# Patient Record
Sex: FEMALE | Race: WHITE
[De-identification: names, ages, dates, MRNs, and addresses within clinical notes are randomized per-mention and may not be internally consistent; named-entity substitution may affect disease eponyms.]

---

## 2017-02-15 NOTE — REP
Clinical:  Pain.

 

Technique:  AP, lateral, bilateral oblique views of the left ankle.

 

Findings:

Soft tissue swelling is appreciated.  Age-related degenerative changes noted.  No

acute fracture or dislocation.  Ankle mortise intact.

 

Impression:

Soft tissue swelling and age-related degenerative changes.

 

 

Signed by

Tony Grigsby MD 02/15/2017 12:23 P

## 2017-05-11 NOTE — REP
REASON:  Tobacco abuse.

 

Only lung window images were sent to the PACS system for review.

 

The interstitial markings are generally increased.  There is no evidence of a

significant nodule, mass, or opacity.

 

IMPRESSION:

 

Interstitial fibrotic changes are suspected.  Pulmonary consultation is

recommended.  There is no evidence of a significant nodule.

 

 

Signed by

Maurice Amador DO 05/11/2017 03:37 P

## 2017-05-17 NOTE — REP
Digital screening mammography with CAD:

 

No comparison mammography available.  The patient relates a history of a previous

lumpectomy on the left.  No current breast symptoms.

 

Findings:  There is mild vascular calcification.  Breast parenchyma shows

scattered fibrous glandular elements and scattered non-grouped calcifications

bilaterally.  There is an asymmetric density at approximate 12 o'clock in the

right breast which is partly fat density.  This may be benign fat necrosis.  It

is asymmetric.  Further evaluation is recommended.  No other suspicious

mammographic finding.

 

Impression:

 

BIRADS category 0 incomplete breast imaging.  Asymmetric density at 12 o'clock in

the anterior third of the left breast may be postoperative change.  Diagnostic

left breast mammography and focused left breast sonography recommended.

 

This mammogram was interpreted with the aid of an FDA-approved computer-aided

detection system.   The patient states she/he had a clinical breast exam in May

2017.

 

The patient letter being requested is M0.

## 2017-05-19 NOTE — DEXA
AP SPINE   L1 - L4   1.092   -0.8       0.2

LT FEMUR   TOTAL   0.834   -1.4      -0.1

RT FEMUR   TOTAL   0.769   -1.9      -0.6

TOTAL BODY   TOTAL

OTHER



DUAL FEMUR FRAX* ASSESSMENT

Risk factors:               None.

10 year probability of fracture

Major osteoporotic fracture            15.8 %

Hip fracture                 4.8 %



COMMENTS:

Normal bone densitometry of the spine.

There is low bone density of the hips.

The density of the spine has increased 12.0% since the initial exam on 08/29/
2002.

The spine density has increased 5.6% since the most recent exam on 12/13/2007.

The density of the left hip has decreased 3.0% since the initial exam on 08/29/
2002.

The density of the left hip has decreased 6.0% since the the most recent exam 
on 12/13/2007.

The density of the right hip has decreased 7.0% since the initial exam on 08/29/
2002.

The density of the right hip has decreased 11.5% since the most recent exam on 
12/13/2007.



FOLLOW-UP:

Recommendation for the next bone density exam: 2 years.
CHRISTY

## 2017-06-16 ENCOUNTER — HOSPITAL ENCOUNTER (OUTPATIENT)
Dept: HOSPITAL 53 - M RAD | Age: 77
End: 2017-06-16
Attending: FAMILY MEDICINE
Payer: MEDICARE

## 2017-06-16 DIAGNOSIS — R92.8: Primary | ICD-10-CM

## 2017-06-16 PROCEDURE — 76642 ULTRASOUND BREAST LIMITED: CPT

## 2017-06-16 NOTE — REP
BILATERAL DIAGNOSTIC MAMMOGRAM AND BILATERAL BREAST ULTRASOUND:

 

Bilateral diagnostic mammography is performed with multiple bilateral spot

compression views obtained. No nodules are seen in the left breast. However, in

the right breast at 12-o'clock position, there does appear to be a persistent

somewhat irregular nodule approximately 1 cm in diameter. There appears to be

central fat density within the nodule. Margins are again somewhat ill defined.

 

Real-time sonographic evaluation of the 12-o'clock region of the right breast

demonstrates a shadowing hypoechoic nodule measuring 1.0 x 0.9 x 1.1 cm. Biopsy

is recommended. Real-time sonographic evaluation of left breast performed in the

retroareolar region demonstrates some dilated ducts with no discrete nodule.

 

IMPRESSION:

 

ACR 4 suspicious mammogram right breast. There does appear to be a somewhat

irregular 1 cm nodule at 12-o'clock position of the right breast, and this does

appear to contain central fat density. By ultrasound, it is shadowing and

demonstrates internal blood flow with Doppler evaluation. Recommend

ultrasound-guided biopsy with postprocedure mammogram. ACR 4 suspicious.

 

B-RADS/ACR category 4 mammogram.  Suspicious abnormality - biopsy should be

considered.  Usually requires biopsy.

 

The patient letter being requested is M4.

 

 

Signed by

Raúl Archuleta MD 06/16/2017 05:24 P

## 2017-06-19 ENCOUNTER — HOSPITAL ENCOUNTER (OUTPATIENT)
Dept: HOSPITAL 53 - M SFHCPLAZ | Age: 77
End: 2017-06-19
Attending: NURSE PRACTITIONER
Payer: MEDICARE

## 2017-06-19 DIAGNOSIS — Z53.8: ICD-10-CM

## 2017-06-19 DIAGNOSIS — R06.02: Primary | ICD-10-CM

## 2017-06-19 DIAGNOSIS — I50.40: ICD-10-CM

## 2017-06-21 ENCOUNTER — HOSPITAL ENCOUNTER (OUTPATIENT)
Dept: HOSPITAL 53 - M RAD | Age: 77
End: 2017-06-21
Attending: NURSE PRACTITIONER
Payer: MEDICARE

## 2017-06-21 ENCOUNTER — HOSPITAL ENCOUNTER (OUTPATIENT)
Dept: HOSPITAL 53 - M SFHCPLAZ | Age: 77
End: 2017-06-21
Attending: FAMILY MEDICINE
Payer: MEDICARE

## 2017-06-21 DIAGNOSIS — D50.0: ICD-10-CM

## 2017-06-21 DIAGNOSIS — D50.0: Primary | ICD-10-CM

## 2017-06-21 DIAGNOSIS — R06.02: Primary | ICD-10-CM

## 2017-06-21 LAB
ERYTHROCYTE [DISTWIDTH] IN BLOOD BY AUTOMATED COUNT: 16.7 % (ref 11.5–14.5)
MCH RBC QN AUTO: 27.5 PG (ref 27–33)
MCHC RBC AUTO-ENTMCNC: 29.6 G/DL (ref 32–36.5)
MCV RBC AUTO: 92.7 FL (ref 80–96)
PLATELET # BLD AUTO: 263 K/MM3 (ref 150–450)
WBC # BLD AUTO: 6.1 K/MM3 (ref 4–10)

## 2017-06-26 ENCOUNTER — HOSPITAL ENCOUNTER (OUTPATIENT)
Dept: HOSPITAL 53 - M SFHCPLAZ | Age: 77
End: 2017-06-26
Attending: NURSE PRACTITIONER
Payer: MEDICARE

## 2017-06-26 DIAGNOSIS — N30.00: Primary | ICD-10-CM

## 2017-06-26 PROCEDURE — 87088 URINE BACTERIA CULTURE: CPT

## 2017-06-26 PROCEDURE — 87186 SC STD MICRODIL/AGAR DIL: CPT

## 2017-06-26 PROCEDURE — 81002 URINALYSIS NONAUTO W/O SCOPE: CPT

## 2017-07-25 ENCOUNTER — HOSPITAL ENCOUNTER (OUTPATIENT)
Dept: HOSPITAL 53 - M RADPRO | Age: 77
Discharge: HOME | End: 2017-07-25
Attending: SURGERY
Payer: MEDICARE

## 2017-07-25 DIAGNOSIS — M10.9: ICD-10-CM

## 2017-07-25 DIAGNOSIS — Z87.891: ICD-10-CM

## 2017-07-25 DIAGNOSIS — Z95.5: ICD-10-CM

## 2017-07-25 DIAGNOSIS — J98.4: ICD-10-CM

## 2017-07-25 DIAGNOSIS — N18.3: ICD-10-CM

## 2017-07-25 DIAGNOSIS — E78.00: ICD-10-CM

## 2017-07-25 DIAGNOSIS — N63: Primary | ICD-10-CM

## 2017-07-25 DIAGNOSIS — K21.9: ICD-10-CM

## 2017-07-25 DIAGNOSIS — I12.9: ICD-10-CM

## 2017-07-25 DIAGNOSIS — L76.32: ICD-10-CM

## 2017-07-25 DIAGNOSIS — Z79.899: ICD-10-CM

## 2017-07-25 PROCEDURE — 19083 BX BREAST 1ST LESION US IMAG: CPT

## 2017-07-25 PROCEDURE — 88305 TISSUE EXAM BY PATHOLOGIST: CPT

## 2017-07-25 NOTE — REP
POST BIOPSY MAMMOGRAM RIGHT BREAST:

 

Post biopsy mammogram right breast performed.  Patient had ultrasound guided

biopsy of a suspicious nodule at 12 o'clock in the right breast.  There is a

metallic clip now seen on today's mammogram at the site of the nodule comparing

to the prior mammogram of 06/16/2017.

 

 

Signed by

Raúl Archuleta MD 07/25/2017 03:30 P

## 2017-07-25 NOTE — REP
ULTRASOUND GUIDED RIGHT BREAST BIOPSY:

 

The procedure was performed under the direct supervision of Dr. Archuleta.

 

The patient has a history of an irregular 1 cm nodule at the 12 o'clock position

of the right breast seen on a previous ultrasound dated 06/16/2017.

 

The risks and benefits of the procedure were explained to the patient and

informed consent was obtained.

 

The right breast nodule was localized using ultrasound guidance. The skin was

prepped and draped in a sterile fashion 1% Xylocaine was used as a local

anesthetic. Using ultrasound guidance a 13-gauge suction assisted mammotome

needle was inserted and 5 core biopsy samples were obtained. A marker clip was

placed at the biopsy site.

 

Post-biopsy ultrasound demonstrates a small hematoma. Manual pressure was placed

over the site for approximately 5-10 minutes. Ultrasound images obtained

approximately half an hour later show enlargement of the hematoma. Again manual

pressure was held at the site. Images obtained approximately 1 hour later show

the hematoma to have gotten slightly smaller.

 

After the appropriate amount of monitored convalescence the patient was

discharged from the department.

 

The patient tolerated the procedure well and there were no immediate

complications.

 

 

Reviewed by

BERNARDA Cade 07/26/2017 01:35 PEdited and Signed by

Raúl Archuleta MD 07/27/2017 05:38 P

## 2017-07-31 ENCOUNTER — HOSPITAL ENCOUNTER (OUTPATIENT)
Dept: HOSPITAL 53 - M LAB | Age: 77
End: 2017-07-31
Attending: OPHTHALMOLOGY
Payer: MEDICARE

## 2017-07-31 ENCOUNTER — HOSPITAL ENCOUNTER (OUTPATIENT)
Dept: HOSPITAL 53 - M LAB | Age: 77
End: 2017-07-31
Attending: FAMILY MEDICINE
Payer: MEDICARE

## 2017-07-31 DIAGNOSIS — D50.0: ICD-10-CM

## 2017-07-31 DIAGNOSIS — I10: Primary | ICD-10-CM

## 2017-07-31 DIAGNOSIS — D50.0: Primary | ICD-10-CM

## 2017-07-31 LAB
ERYTHROCYTE [DISTWIDTH] IN BLOOD BY AUTOMATED COUNT: 18 % (ref 11.5–14.5)
MCH RBC QN AUTO: 27.7 PG (ref 27–33)
MCHC RBC AUTO-ENTMCNC: 30.4 G/DL (ref 32–36.5)
MCV RBC AUTO: 91.2 FL (ref 80–96)
PLATELET # BLD AUTO: 303 K/MM3 (ref 150–450)
WBC # BLD AUTO: 7.9 K/MM3 (ref 4–10)

## 2017-07-31 NOTE — ECGEPIP
Stationary ECG Study

                              [facility]

                                       

                                       Test Date:    2017

Pat Name:     LINETTE MONIQUE           Department:   

Patient ID:   [id number]                 Room:         -

Gender:       F                        Technician:   

:          [id number]               Requested By: SHELLEY PURCELL

Order Number: [id number]     Reading MD:   Joesph Amaya

                                 Measurements

Intervals                              Axis          

Rate:         65                       P:            77

VA:           261                      QRS:          99

QRSD:         130                      T:            270

QT:           474                                    

QTc:          493                                    

                           Interpretive Statements

Incorrect limb lead placement.

Underlying sinus rhythm?

Marked first-degree AV block.

Prominent precordial voltage with incomplete LBBB and strain in keeping with

LVH.

Other than incorrect lead placement, no apparent change from 16.

 

Electronically Signed On 2017 19:24:58 EDT by Joesph Amaya

## 2017-08-14 ENCOUNTER — HOSPITAL ENCOUNTER (EMERGENCY)
Dept: HOSPITAL 53 - M ED | Age: 77
Discharge: HOME | End: 2017-08-14
Payer: MEDICARE

## 2017-08-14 VITALS — HEIGHT: 65 IN | WEIGHT: 192.46 LBS | BODY MASS INDEX: 32.07 KG/M2

## 2017-08-14 VITALS — SYSTOLIC BLOOD PRESSURE: 145 MMHG | DIASTOLIC BLOOD PRESSURE: 63 MMHG

## 2017-08-14 DIAGNOSIS — Z87.891: ICD-10-CM

## 2017-08-14 DIAGNOSIS — Z79.01: ICD-10-CM

## 2017-08-14 DIAGNOSIS — Z79.899: ICD-10-CM

## 2017-08-14 DIAGNOSIS — Z88.1: ICD-10-CM

## 2017-08-14 DIAGNOSIS — Z88.5: ICD-10-CM

## 2017-08-14 DIAGNOSIS — I10: ICD-10-CM

## 2017-08-14 DIAGNOSIS — Z88.2: ICD-10-CM

## 2017-08-14 DIAGNOSIS — R04.0: Primary | ICD-10-CM

## 2017-08-14 LAB
BASOPHILS # BLD AUTO: 0 K/MM3 (ref 0–0.2)
BASOPHILS NFR BLD AUTO: 0.6 % (ref 0–1)
EOSINOPHIL # BLD AUTO: 0.2 K/MM3 (ref 0–0.5)
EOSINOPHIL NFR BLD AUTO: 1.7 % (ref 0–3)
ERYTHROCYTE [DISTWIDTH] IN BLOOD BY AUTOMATED COUNT: 18.5 % (ref 11.5–14.5)
INR PPP: 3.12
LARGE UNSTAINED CELL #: 0.1 K/MM3 (ref 0–0.4)
LARGE UNSTAINED CELL %: 1.5 % (ref 0–4)
LYMPHOCYTES # BLD AUTO: 1.4 K/MM3 (ref 1.5–4.5)
LYMPHOCYTES NFR BLD AUTO: 13.9 % (ref 24–44)
MCH RBC QN AUTO: 28.2 PG (ref 27–33)
MCHC RBC AUTO-ENTMCNC: 30.5 G/DL (ref 32–36.5)
MCV RBC AUTO: 92.2 FL (ref 80–96)
MONOCYTES # BLD AUTO: 0.5 K/MM3 (ref 0–0.8)
MONOCYTES NFR BLD AUTO: 5.2 % (ref 0–5)
NEUTROPHILS # BLD AUTO: 7 K/MM3 (ref 1.8–7.7)
NEUTROPHILS NFR BLD AUTO: 77.1 % (ref 36–66)
PLATELET # BLD AUTO: 306 K/MM3 (ref 150–450)
WBC # BLD AUTO: 9.1 K/MM3 (ref 4–10)

## 2017-08-17 ENCOUNTER — HOSPITAL ENCOUNTER (OUTPATIENT)
Dept: HOSPITAL 53 - M SDC | Age: 77
Discharge: HOME | End: 2017-08-17
Attending: OPHTHALMOLOGY
Payer: MEDICARE

## 2017-08-17 VITALS — HEIGHT: 65 IN | BODY MASS INDEX: 31.65 KG/M2 | WEIGHT: 190 LBS

## 2017-08-17 VITALS — SYSTOLIC BLOOD PRESSURE: 137 MMHG | DIASTOLIC BLOOD PRESSURE: 63 MMHG

## 2017-08-17 DIAGNOSIS — D64.9: ICD-10-CM

## 2017-08-17 DIAGNOSIS — I50.9: ICD-10-CM

## 2017-08-17 DIAGNOSIS — I73.9: ICD-10-CM

## 2017-08-17 DIAGNOSIS — H25.12: Primary | ICD-10-CM

## 2017-08-17 DIAGNOSIS — Z79.899: ICD-10-CM

## 2017-08-17 DIAGNOSIS — Z86.73: ICD-10-CM

## 2017-08-17 DIAGNOSIS — N18.4: ICD-10-CM

## 2017-08-17 DIAGNOSIS — Z95.1: ICD-10-CM

## 2017-08-17 DIAGNOSIS — Z88.5: ICD-10-CM

## 2017-08-17 DIAGNOSIS — I34.8: ICD-10-CM

## 2017-08-17 DIAGNOSIS — I10: ICD-10-CM

## 2017-08-17 DIAGNOSIS — I25.10: ICD-10-CM

## 2017-08-17 DIAGNOSIS — E78.5: ICD-10-CM

## 2017-08-17 DIAGNOSIS — Z88.2: ICD-10-CM

## 2017-08-17 LAB — INR PPP: 2.8

## 2017-08-17 PROCEDURE — 36415 COLL VENOUS BLD VENIPUNCTURE: CPT

## 2017-08-17 PROCEDURE — 66984 XCAPSL CTRC RMVL W/O ECP: CPT

## 2017-08-17 PROCEDURE — 85610 PROTHROMBIN TIME: CPT

## 2017-08-20 NOTE — RO
DATE OF PROCEDURE:  08/17/2017



PREOPERATIVE DIAGNOSIS:

Visually significant nuclear sclerotic cataract left eye.

 

POSTOPERATIVE DIAGNOSIS:

Visually significant nuclear sclerotic cataract left eye.

 

PROCEDURE:

Cataract extraction with use of phacoemulsification, and placement of 
intraocular

lens, AU00T0 20.5D , left eye.

 

SURGEON:  Jarad Ballesteros DO

 

ASSISTANT:

 

ANESTHESIA:  Local with monitored anesthesia care (MAC).

 

COMPLICATIONS:  None.

 

POSTOPERATIVE CONDITION:  Stable.

 

INDICATION FOR SURGERY:

Blurred vision left eye affecting patient's activities of daily living.

 

DESCRIPTION OF PROCEDURE:  The patient was seen in the preoperative area and

properly identified.  The correct operative eye was identified and marked.

Attention was turned to that eye.  The patient received topical antibiotics in

the preoperative area.  The patient then received topical dilating drops

consisting of Tropicamide and Phenylephrine.

 

The patient was then transferred to the operating room.  The correct side was

re-identified.  The patient received topical anesthetics and antibiotics on the

surface of the eye.  The eye was prepped and draped in a sterile fashion.  The

upper and lower eyelids were isolated with Tegaderm tape, and the lids were held

open with an adjustable speculum.

 

Using a sideport blade, a paracentesis incision was made.  Intraocular

preservative-free lidocaine was then injected into the anterior chamber.

Viscoelastic was then injected into the anterior chamber through the

paracentesis.  Using a 2.6 mm sharp-tipped keratome, the anterior chamber was

entered via a temporal clear corneal incision.

 

A continuous curvilinear capsulorrhexis was created with the aid of a 26g

cystotome and utrata forceps.  Hydrodissection was performed with balanced salt

solution (BSS) on a blunt cannula until the nucleus was freely mobile.  The

crystalline lens was phacoemulsified and aspirated.  Additional cohesive

viscoelastic was placed into the capsular bag to deepen it.  A 20.5 lens D was

placed into the capsular bag and confirmed by visualizing the continuous

curvilinear capsulorrhexis.  Additional irrigation and aspiration was used to

remove cortical material and remaining viscoelastic.

 

The clear corneal incision was hydrated with BSS on a blunt cannula.  The lens

was well positioned.  The incisions were then tested for leaks and found to be

negative.  The eye was then palpated for appropriate pressure and adjusted

accordingly with BSS.

 

The eyelid speculum was carefully removed.  A shield was placed over the eye.

 

The patient tolerated the procedure well and was discharged to the recovery unit

in a stable condition.



edited: 08/21/2017 1437 tkf

MTDALICIA

## 2017-08-28 ENCOUNTER — HOSPITAL ENCOUNTER (OUTPATIENT)
Dept: HOSPITAL 53 - M LAB | Age: 77
End: 2017-08-28
Attending: FAMILY MEDICINE
Payer: MEDICARE

## 2017-08-28 DIAGNOSIS — D50.0: Primary | ICD-10-CM

## 2017-08-28 LAB
ADD MORPHOLOGY?: YES
ANISOCYTOSIS BLD QL SMEAR: (no result)
BASOPHILS # BLD AUTO: 0.1 K/MM3 (ref 0–0.2)
BASOPHILS NFR BLD AUTO: 1 % (ref 0–1)
EOSINOPHIL # BLD AUTO: 0.2 K/MM3 (ref 0–0.5)
EOSINOPHIL NFR BLD AUTO: 2.3 % (ref 0–3)
ERYTHROCYTE [DISTWIDTH] IN BLOOD BY AUTOMATED COUNT: 17.3 % (ref 11.5–14.5)
HYPOCHROMIA BLD QL SMEAR: (no result)
IRON SATN MFR SERPL: 13.2 % (ref 13.2–45)
LARGE UNSTAINED CELL #: 0.2 K/MM3 (ref 0–0.4)
LARGE UNSTAINED CELL %: 2.3 % (ref 0–4)
LYMPHOCYTES # BLD AUTO: 1 K/MM3 (ref 1.5–4.5)
LYMPHOCYTES NFR BLD AUTO: 14.7 % (ref 24–44)
MCH RBC QN AUTO: 27.3 PG (ref 27–33)
MCHC RBC AUTO-ENTMCNC: 29.9 G/DL (ref 32–36.5)
MCV RBC AUTO: 91.1 FL (ref 80–96)
MONOCYTES # BLD AUTO: 0.4 K/MM3 (ref 0–0.8)
MONOCYTES NFR BLD AUTO: 6.2 % (ref 0–5)
NEUTROPHILS # BLD AUTO: 5.2 K/MM3 (ref 1.8–7.7)
NEUTROPHILS NFR BLD AUTO: 73.5 % (ref 36–66)
OVALOCYTES BLD QL SMEAR: (no result)
PLATELET # BLD AUTO: 332 K/MM3 (ref 150–450)
POLYCHROMASIA BLD QL SMEAR: (no result)
TIBC SERPL-MCNC: 408 UG/DL (ref 250–450)
WBC # BLD AUTO: 7 K/MM3 (ref 4–10)

## 2017-08-30 ENCOUNTER — HOSPITAL ENCOUNTER (OUTPATIENT)
Dept: HOSPITAL 53 - M OPCLI4PV | Age: 77
Discharge: HOME | End: 2017-08-30
Attending: NURSE PRACTITIONER
Payer: MEDICARE

## 2017-08-30 DIAGNOSIS — Z88.2: ICD-10-CM

## 2017-08-30 DIAGNOSIS — Z88.8: ICD-10-CM

## 2017-08-30 DIAGNOSIS — D50.0: Primary | ICD-10-CM

## 2017-08-30 DIAGNOSIS — Z88.5: ICD-10-CM

## 2017-08-30 PROCEDURE — 86920 COMPATIBILITY TEST SPIN: CPT

## 2017-08-30 PROCEDURE — 36415 COLL VENOUS BLD VENIPUNCTURE: CPT

## 2017-08-30 PROCEDURE — 86901 BLOOD TYPING SEROLOGIC RH(D): CPT

## 2017-08-30 PROCEDURE — 36430 TRANSFUSION BLD/BLD COMPNT: CPT

## 2017-08-30 PROCEDURE — 86900 BLOOD TYPING SEROLOGIC ABO: CPT

## 2017-08-30 PROCEDURE — 86850 RBC ANTIBODY SCREEN: CPT

## 2017-08-31 ENCOUNTER — HOSPITAL ENCOUNTER (OUTPATIENT)
Dept: HOSPITAL 53 - M SDC | Age: 77
Discharge: HOME | End: 2017-08-31
Attending: OPHTHALMOLOGY
Payer: MEDICARE

## 2017-08-31 VITALS — HEIGHT: 65 IN | WEIGHT: 190 LBS | BODY MASS INDEX: 31.65 KG/M2

## 2017-08-31 VITALS — SYSTOLIC BLOOD PRESSURE: 133 MMHG | DIASTOLIC BLOOD PRESSURE: 65 MMHG

## 2017-08-31 DIAGNOSIS — N18.3: ICD-10-CM

## 2017-08-31 DIAGNOSIS — G47.33: ICD-10-CM

## 2017-08-31 DIAGNOSIS — Z79.899: ICD-10-CM

## 2017-08-31 DIAGNOSIS — I65.23: ICD-10-CM

## 2017-08-31 DIAGNOSIS — Z86.73: ICD-10-CM

## 2017-08-31 DIAGNOSIS — I50.32: ICD-10-CM

## 2017-08-31 DIAGNOSIS — I25.10: ICD-10-CM

## 2017-08-31 DIAGNOSIS — D50.9: ICD-10-CM

## 2017-08-31 DIAGNOSIS — E78.5: ICD-10-CM

## 2017-08-31 DIAGNOSIS — Z95.5: ICD-10-CM

## 2017-08-31 DIAGNOSIS — I34.8: ICD-10-CM

## 2017-08-31 DIAGNOSIS — I48.91: ICD-10-CM

## 2017-08-31 DIAGNOSIS — I73.9: ICD-10-CM

## 2017-08-31 DIAGNOSIS — J44.9: ICD-10-CM

## 2017-08-31 DIAGNOSIS — Z95.1: ICD-10-CM

## 2017-08-31 DIAGNOSIS — H25.11: Primary | ICD-10-CM

## 2017-08-31 DIAGNOSIS — Z88.2: ICD-10-CM

## 2017-08-31 DIAGNOSIS — I25.2: ICD-10-CM

## 2017-08-31 DIAGNOSIS — Z99.81: ICD-10-CM

## 2017-08-31 DIAGNOSIS — Z88.5: ICD-10-CM

## 2017-08-31 DIAGNOSIS — Z88.1: ICD-10-CM

## 2017-08-31 DIAGNOSIS — Z79.01: ICD-10-CM

## 2017-08-31 DIAGNOSIS — I13.0: ICD-10-CM

## 2017-08-31 PROCEDURE — 66984 XCAPSL CTRC RMVL W/O ECP: CPT

## 2017-09-01 NOTE — RO
DATE OF PROCEDURE:  08/31/2017

 

PREOPERATIVE DIAGNOSIS:

Visually significant nuclear sclerotic cataract right eye.

 

POSTOPERATIVE DIAGNOSIS:

Visually significant nuclear sclerotic cataract right eye.

 

PROCEDURE:

Cataract extraction with use of phacoemulsification, and placement of 
intraocular

lens, AU00T0, 23.5 D, right eye.

 

SURGEON:  Jarad Ballesteros DO

 

ASSISTANT:

 

ANESTHESIA:  Local with monitored anesthesia care (MAC).

 

COMPLICATIONS:  None.

 

POSTOPERATIVE CONDITION:  Stable.

 

INDICATION FOR SURGERY:

Blurred vision right eye affecting patient's activities of daily living.

 

DESCRIPTION OF PROCEDURE:  The patient was seen in the preoperative area and

properly identified.  The correct operative eye was identified and marked.

Attention was turned to that eye.  The patient received topical antibiotics in

the preoperative area.  The patient then received topical dilating drops

consisting of Tropicamide and Phenylephrine.

 

The patient was then transferred to the operating room.  The correct side was

re-identified.  The patient received topical anesthetics and antibiotics on the

surface of the eye.  The eye was prepped and draped in a sterile fashion.  The

upper and lower eyelids were isolated with Tegaderm tape, and the lids were held

open with an adjustable speculum.

 

Using a sideport blade, a paracentesis incision was made.  Intraocular

preservative-free lidocaine was then injected into the anterior chamber.

Viscoelastic was then injected into the anterior chamber through the

paracentesis.  Using a 2.6 mm sharp-tipped keratome, the anterior chamber was

entered via a temporal clear corneal incision.

 

A continuous curvilinear capsulorrhexis was created with the aid of a 26g

cystotome and utrata forceps.  Hydrodissection was performed with balanced salt

solution (BSS) on a blunt cannula until the nucleus was freely mobile.  The

crystalline lens was phacoemulsified and aspirated.  Additional cohesive

viscoelastic was placed into the capsular bag to deepen it.  An AU00T0, 23.5 D

was placed into the capsular bag and confirmed by visualizing the continuous

curvilinear capsulorrhexis.  Additional irrigation and aspiration was used to

remove cortical material and remaining viscoelastic.

 

The clear corneal incision was hydrated with BSS on a blunt cannula.  The lens

was well positioned.  The incisions were then tested for leaks and found to be

negative.  The eye was then palpated for appropriate pressure and adjusted

accordingly with BSS.

 

The eyelid speculum was carefully removed.  A shield was placed over the eye.

 

The patient tolerated the procedure well and was discharged to the recovery unit

in a stable condition.

CHRISTY

## 2017-10-17 ENCOUNTER — HOSPITAL ENCOUNTER (OUTPATIENT)
Dept: HOSPITAL 53 - M WUC | Age: 77
End: 2017-10-17
Attending: FAMILY MEDICINE
Payer: MEDICARE

## 2017-10-17 DIAGNOSIS — D50.0: Primary | ICD-10-CM

## 2017-10-17 LAB
BASOPHILS # BLD AUTO: 0.1 10^3/UL (ref 0–0.2)
BASOPHILS NFR BLD AUTO: 1 % (ref 0–1)
EOSINOPHIL # BLD AUTO: 0.3 10^3/UL (ref 0–0.5)
EOSINOPHIL NFR BLD AUTO: 3.5 % (ref 0–3)
ERYTHROCYTE [DISTWIDTH] IN BLOOD BY AUTOMATED COUNT: 17.3 % (ref 11.5–14.5)
IMM GRANULOCYTES NFR BLD: 0.5 % (ref 0–0)
INR PPP: 2.05
LYMPHOCYTES # BLD AUTO: 1.4 10^3/UL (ref 1.5–4.5)
LYMPHOCYTES NFR BLD AUTO: 15.1 % (ref 24–44)
MCH RBC QN AUTO: 28.2 PG (ref 27–33)
MCHC RBC AUTO-ENTMCNC: 29.7 G/DL (ref 32–36.5)
MCV RBC AUTO: 95.1 FL (ref 80–96)
MONOCYTES # BLD AUTO: 1 10^3/UL (ref 0–0.8)
MONOCYTES NFR BLD AUTO: 10.7 % (ref 0–5)
NEUTROPHILS # BLD AUTO: 6.4 10^3/UL (ref 1.8–7.7)
NEUTROPHILS NFR BLD AUTO: 69.2 % (ref 36–66)
NRBC BLD AUTO-RTO: 0 % (ref 0–0)
PLATELET # BLD AUTO: 351 10^3/UL (ref 150–450)
WBC # BLD AUTO: 9.2 10^3/UL (ref 4–10)

## 2017-11-16 ENCOUNTER — HOSPITAL ENCOUNTER (OUTPATIENT)
Dept: HOSPITAL 53 - M SFHCPLAZ | Age: 77
End: 2017-11-16
Attending: NURSE PRACTITIONER
Payer: MEDICARE

## 2017-11-16 DIAGNOSIS — Z51.81: Primary | ICD-10-CM

## 2017-11-16 DIAGNOSIS — N18.3: ICD-10-CM

## 2017-11-16 DIAGNOSIS — D50.0: ICD-10-CM

## 2017-11-16 DIAGNOSIS — Z79.899: ICD-10-CM

## 2017-11-16 LAB
ALBUMIN SERPL BCG-MCNC: 3.5 GM/DL (ref 3.2–5.2)
ALBUMIN/GLOB SERPL: 0.74 {RATIO} (ref 1–1.93)
ALP SERPL-CCNC: 101 U/L (ref 45–117)
ALT SERPL W P-5'-P-CCNC: 22 U/L (ref 12–78)
ANION GAP SERPL CALC-SCNC: 5 MEQ/L (ref 8–16)
AST SERPL-CCNC: 33 U/L (ref 7–37)
BASOPHILS # BLD AUTO: 0.1 10^3/UL (ref 0–0.2)
BASOPHILS NFR BLD AUTO: 0.6 % (ref 0–1)
BILIRUB SERPL-MCNC: 0.5 MG/DL (ref 0.2–1)
BUN SERPL-MCNC: 79 MG/DL (ref 7–18)
CALCIUM SERPL-MCNC: 9.8 MG/DL (ref 8.8–10.2)
CHLORIDE SERPL-SCNC: 96 MEQ/L (ref 98–107)
CO2 SERPL-SCNC: 35 MEQ/L (ref 21–32)
CREAT SERPL-MCNC: 2.22 MG/DL (ref 0.55–1.02)
EOSINOPHIL # BLD AUTO: 0.3 10^3/UL (ref 0–0.5)
EOSINOPHIL NFR BLD AUTO: 3.7 % (ref 0–3)
ERYTHROCYTE [DISTWIDTH] IN BLOOD BY AUTOMATED COUNT: 17.6 % (ref 11.5–14.5)
GFR SERPL CREATININE-BSD FRML MDRD: 22.8 ML/MIN/{1.73_M2} (ref 39–?)
GLUCOSE SERPL-MCNC: 92 MG/DL (ref 83–110)
IMM GRANULOCYTES NFR BLD: 0.5 % (ref 0–0)
IRON SATN MFR SERPL: 13.6 % (ref 13.2–45)
LYMPHOCYTES # BLD AUTO: 1.3 10^3/UL (ref 1.5–4.5)
LYMPHOCYTES NFR BLD AUTO: 14.1 % (ref 24–44)
MCH RBC QN AUTO: 27.9 PG (ref 27–33)
MCHC RBC AUTO-ENTMCNC: 29 G/DL (ref 32–36.5)
MCV RBC AUTO: 96.3 FL (ref 80–96)
MONOCYTES # BLD AUTO: 1 10^3/UL (ref 0–0.8)
MONOCYTES NFR BLD AUTO: 10.5 % (ref 0–5)
NEUTROPHILS # BLD AUTO: 6.6 10^3/UL (ref 1.8–7.7)
NEUTROPHILS NFR BLD AUTO: 70.6 % (ref 36–66)
NRBC BLD AUTO-RTO: 0 % (ref 0–0)
PLATELET # BLD AUTO: 361 10^3/UL (ref 150–450)
POTASSIUM SERPL-SCNC: 4.6 MEQ/L (ref 3.5–5.1)
PROT SERPL-MCNC: 8.2 GM/DL (ref 6.4–8.2)
SODIUM SERPL-SCNC: 136 MEQ/L (ref 136–145)
TIBC SERPL-MCNC: 376 UG/DL (ref 250–450)
WBC # BLD AUTO: 9.3 10^3/UL (ref 4–10)

## 2017-11-16 PROCEDURE — 85610 PROTHROMBIN TIME: CPT

## 2017-11-16 PROCEDURE — 36415 COLL VENOUS BLD VENIPUNCTURE: CPT

## 2017-11-16 PROCEDURE — 85025 COMPLETE CBC W/AUTO DIFF WBC: CPT

## 2017-11-16 PROCEDURE — 83550 IRON BINDING TEST: CPT

## 2017-11-16 PROCEDURE — 80053 COMPREHEN METABOLIC PANEL: CPT

## 2017-12-04 ENCOUNTER — HOSPITAL ENCOUNTER (OUTPATIENT)
Dept: HOSPITAL 53 - M SFHCPLAZ | Age: 77
End: 2017-12-04
Attending: NURSE PRACTITIONER
Payer: MEDICARE

## 2017-12-04 DIAGNOSIS — D50.9: Primary | ICD-10-CM

## 2017-12-04 LAB
ERYTHROCYTE [DISTWIDTH] IN BLOOD BY AUTOMATED COUNT: 17.3 % (ref 11.5–14.5)
MCH RBC QN AUTO: 28.4 PG (ref 27–33)
MCHC RBC AUTO-ENTMCNC: 30.3 G/DL (ref 32–36.5)
MCV RBC AUTO: 93.5 FL (ref 80–96)
NRBC BLD AUTO-RTO: 0 % (ref 0–0)
PLATELET # BLD AUTO: 350 10^3/UL (ref 150–450)
WBC # BLD AUTO: 9.4 10^3/UL (ref 4–10)

## 2017-12-04 PROCEDURE — 85610 PROTHROMBIN TIME: CPT

## 2017-12-04 PROCEDURE — 85027 COMPLETE CBC AUTOMATED: CPT

## 2017-12-04 PROCEDURE — 36415 COLL VENOUS BLD VENIPUNCTURE: CPT

## 2017-12-15 ENCOUNTER — HOSPITAL ENCOUNTER (OUTPATIENT)
Dept: HOSPITAL 53 - M ED | Age: 77
Setting detail: OBSERVATION
Discharge: HOME | End: 2017-12-15
Attending: FAMILY MEDICINE | Admitting: HOSPITALIST
Payer: MEDICARE

## 2017-12-15 VITALS — HEIGHT: 66 IN | WEIGHT: 175.27 LBS | BODY MASS INDEX: 28.17 KG/M2

## 2017-12-15 VITALS — SYSTOLIC BLOOD PRESSURE: 122 MMHG | DIASTOLIC BLOOD PRESSURE: 74 MMHG

## 2017-12-15 DIAGNOSIS — Z79.01: ICD-10-CM

## 2017-12-15 DIAGNOSIS — Z95.2: ICD-10-CM

## 2017-12-15 DIAGNOSIS — Z88.1: ICD-10-CM

## 2017-12-15 DIAGNOSIS — Z87.891: ICD-10-CM

## 2017-12-15 DIAGNOSIS — Z88.2: ICD-10-CM

## 2017-12-15 DIAGNOSIS — I65.29: ICD-10-CM

## 2017-12-15 DIAGNOSIS — I13.0: ICD-10-CM

## 2017-12-15 DIAGNOSIS — N18.4: ICD-10-CM

## 2017-12-15 DIAGNOSIS — E78.5: ICD-10-CM

## 2017-12-15 DIAGNOSIS — I48.2: ICD-10-CM

## 2017-12-15 DIAGNOSIS — R04.0: Primary | ICD-10-CM

## 2017-12-15 DIAGNOSIS — I25.10: ICD-10-CM

## 2017-12-15 DIAGNOSIS — Z88.5: ICD-10-CM

## 2017-12-15 DIAGNOSIS — D64.9: ICD-10-CM

## 2017-12-15 DIAGNOSIS — J44.9: ICD-10-CM

## 2017-12-15 DIAGNOSIS — I73.9: ICD-10-CM

## 2017-12-15 DIAGNOSIS — Z79.899: ICD-10-CM

## 2017-12-15 DIAGNOSIS — G47.33: ICD-10-CM

## 2017-12-15 DIAGNOSIS — Z95.1: ICD-10-CM

## 2017-12-15 LAB
BASOPHILS # BLD AUTO: 0.1 10^3/UL (ref 0–0.2)
BASOPHILS # BLD AUTO: 0.1 10^3/UL (ref 0–0.2)
BASOPHILS NFR BLD AUTO: 0.5 % (ref 0–1)
BASOPHILS NFR BLD AUTO: 0.6 % (ref 0–1)
EOSINOPHIL # BLD AUTO: 0.2 10^3/UL (ref 0–0.5)
EOSINOPHIL # BLD AUTO: 0.2 10^3/UL (ref 0–0.5)
EOSINOPHIL NFR BLD AUTO: 1.9 % (ref 0–3)
EOSINOPHIL NFR BLD AUTO: 2 % (ref 0–3)
ERYTHROCYTE [DISTWIDTH] IN BLOOD BY AUTOMATED COUNT: 17.5 % (ref 11.5–14.5)
ERYTHROCYTE [DISTWIDTH] IN BLOOD BY AUTOMATED COUNT: 18.4 % (ref 11.5–14.5)
IMM GRANULOCYTES NFR BLD: 0.7 % (ref 0–0)
IMM GRANULOCYTES NFR BLD: 0.7 % (ref 0–0)
INR PPP: 2.39
LYMPHOCYTES # BLD AUTO: 1.4 10^3/UL (ref 1.5–4.5)
LYMPHOCYTES # BLD AUTO: 1.9 10^3/UL (ref 1.5–4.5)
LYMPHOCYTES NFR BLD AUTO: 12.4 % (ref 24–44)
LYMPHOCYTES NFR BLD AUTO: 21.1 % (ref 24–44)
MCH RBC QN AUTO: 29.4 PG (ref 27–33)
MCH RBC QN AUTO: 29.4 PG (ref 27–33)
MCHC RBC AUTO-ENTMCNC: 31 G/DL (ref 32–36.5)
MCHC RBC AUTO-ENTMCNC: 31.4 G/DL (ref 32–36.5)
MCV RBC AUTO: 93.8 FL (ref 80–96)
MCV RBC AUTO: 94.5 FL (ref 80–96)
MONOCYTES # BLD AUTO: 0.8 10^3/UL (ref 0–0.8)
MONOCYTES # BLD AUTO: 1 10^3/UL (ref 0–0.8)
MONOCYTES NFR BLD AUTO: 8.4 % (ref 0–5)
MONOCYTES NFR BLD AUTO: 9 % (ref 0–5)
NEUTROPHILS # BLD AUTO: 5.9 10^3/UL (ref 1.8–7.7)
NEUTROPHILS # BLD AUTO: 8.7 10^3/UL (ref 1.8–7.7)
NEUTROPHILS NFR BLD AUTO: 66.7 % (ref 36–66)
NEUTROPHILS NFR BLD AUTO: 76 % (ref 36–66)
NRBC BLD AUTO-RTO: 0 % (ref 0–0)
NRBC BLD AUTO-RTO: 0.5 % (ref 0–0)
PLATELET # BLD AUTO: 240 10^3/UL (ref 150–450)
PLATELET # BLD AUTO: 336 10^3/UL (ref 150–450)
WBC # BLD AUTO: 11.5 10^3/UL (ref 4–10)
WBC # BLD AUTO: 8.8 10^3/UL (ref 4–10)

## 2017-12-15 PROCEDURE — 86901 BLOOD TYPING SEROLOGIC RH(D): CPT

## 2017-12-15 PROCEDURE — 85025 COMPLETE CBC W/AUTO DIFF WBC: CPT

## 2017-12-15 PROCEDURE — 36430 TRANSFUSION BLD/BLD COMPNT: CPT

## 2017-12-15 PROCEDURE — 85610 PROTHROMBIN TIME: CPT

## 2017-12-15 PROCEDURE — 36415 COLL VENOUS BLD VENIPUNCTURE: CPT

## 2017-12-15 PROCEDURE — 99284 EMERGENCY DEPT VISIT MOD MDM: CPT

## 2017-12-15 PROCEDURE — 86900 BLOOD TYPING SEROLOGIC ABO: CPT

## 2017-12-15 PROCEDURE — 86850 RBC ANTIBODY SCREEN: CPT

## 2017-12-15 PROCEDURE — 86920 COMPATIBILITY TEST SPIN: CPT

## 2017-12-15 PROCEDURE — 85730 THROMBOPLASTIN TIME PARTIAL: CPT

## 2017-12-15 NOTE — HPE
DATE OF ADMISSION:  12/15/2017

 

PRIMARY CARE PROVIDER:  Dr. Zackary Hernandez

 

CHIEF COMPLAINT:  Nose bleeding for two days.

 

HISTORY OF PRESENT ILLNESS:  The patient is a 77-year-old white female with

multiple chronic medical conditions including mechanical valve on Coumadin who

presented to the emergency room (ER) for evaluation of nose bleeding in the last

two days.  History is provided by herself as well as by review of chart.  Per the

medical record, she had been hospitalized here in the past due to nose bleeding.

She was seen with the ENT specialist who recommended to use a humidifier since

the patient is on oxygen supplement which can cause irritation of the nose.

Anyway, she stated in the last two days she has intermittent a lot of nose

bleeding which brought her to the ER tonight.  After she came to the ER, her nose

bleeding stopped spontaneously, however, her blood testing indicated her

hemoglobin and hematocrit (H and H) was down to 8.6 over 27.7 from two weeks

which was 10.5 over 35.  Due to multiple comorbidities and ER attending request

admitted for observation to make sure she is not bleeding again.  Otherwise, she

did not have any other complaints.

 

PAST MEDICAL HISTORY:

1.  Mechanical mitral valve replacement, on Coumadin.

2.  Chronic obstructive pulmonary disease (COPD) on 2 liters of oxygen

supplement.

3.  Coronary artery disease.

4.  Carotid artery disease.

5.  Peripheral vascular disease.

6.  Hypertension.

7.  Dyslipidemia.

8.  Chronic kidney disease (CKD) Stage IV.

9.  Chronic atrial fibrillation.

10.  Obstructive sleep apnea.

11.  Chronic anemia.

 

PAST SURGICAL HISTORY:

1.  Cardiac bypass surgery.

2.  Mechanical mitral valve replacement.

3.  Carotid endarterectomy.

4.  Renal artery stenting.

5.  Superior mesenteric artery stenting.

 

FAMILY HISTORY:  Mother with history of COPD and a sister and a brother with

brain cancer and heart disease.

 

SOCIAL HISTORY:  Ex-smoker, quit a few years ago.  Denies alcoholic drinking.  No

drug abuse.  She is a full code.

 

REVIEW OF SYSTEMS:  Denies fever.  No chills.  No headaches.  No blurred vision.

No shortness of breath.  No coughing.  No chest pain.  No abdominal pain.  No

diarrhea.  No constipation.  No tingling or numbness in arms or lower

extremities, except general weakness.

 

MEDICATIONS:  Reviewed, including Coumadin.

 

PHYSICAL EXAMINATION:

VITALS:  Temperature 96.8.  Heart rate 96.  Respiratory rate 16.  Blood pressure

129/53.  Oxygen saturation 96% on 2 liters nasal cannula.

GENERAL:  She is awake, alert and oriented times three.  She is not in acute

distress.

HEENT:  Atraumatic.  Pupils equal round and react to light.  No jaundice.

Extraocular muscles intact.  Ears normal.  Nose with no active bleeding.  He has

oxygen nasal cannula supplement.  Mouth: Mucus is dry.

NECK:  No jugular venous distention (JVD).

LUNGS:  Diminished breath sounds, but no wheezing.  No crackles.

HEART:  S1, S2.  Irregular.

ABDOMEN:  Soft.  Bowel sounds positive.  Nontender.

EXTREMITIES:  No edema in bilateral lower extremities.

NEUROLOGIC:  Nonfocal.

SKIN:  No rash.

PSYCHOLOGIC:  No acute psychosis.

 

DIAGNOSTIC AND LABORATORY STUDIES:  CBC and differential with WBC 11.5, H/H

8.6/27.3 and platelets 336.  Sodium 136, potassium 4.6, BUN 79, creatinine 2.2,

glucose 92 and INR was 2.39.

 

IMPRESSION:

1.  Nose bleeding.

2.  Chronic anemia.

3.  Mechanical mitral valve replacement, on Coumadin, which INR was therapeutic.

4.  CKD Stage IV.

5.  History of coronary artery disease.

6.  History of chronic atrial fibrillation.

 

PLAN:  Patient will be admitted for observation.  Except dropped hemoglobin,

otherwise she is at baseline.  She has this recurrent nose bleeding due to the

fact she is taking Coumadin because of mechanical valve.  Also she will continue

her routine medications.  She may need to be reevaluated by the ENT specialist.

Will recheck CBC at 2:00 p.m. to make sure her H/H is stable and decide whether

she needs any transfusion or not.

## 2017-12-15 NOTE — IPN
DATE:  12/15/2017

 

Jenny is seen in the ER in interim bed.  She was admitted with some anemia

secondary to recurrent epistaxis.  No epistaxis overnight.  It was my

understanding she was going to be transfused, but that has not taken place yet.

She is on chronic anticoagulant therapy which cannot be discontinued due to a

mechanical mitral valve.

 

PHYSICAL EXAMINATION:  No epistaxis.

Lungs:  Clear.

Heart:  Regular rhythm with crisp prosthetic click.

Abdomen:  Soft.  Nontender.  No peripheral edema.

 

LABS:  Lab work was all from this morning.  INR was therapeutic.

 

PLAN:  I am going to transfuse her a unit of blood and then she can be discharged

after the transfusion.

## 2017-12-18 ENCOUNTER — HOSPITAL ENCOUNTER (OUTPATIENT)
Dept: HOSPITAL 53 - M LABDRAWP | Age: 77
End: 2017-12-18
Attending: NURSE PRACTITIONER
Payer: MEDICARE

## 2017-12-18 DIAGNOSIS — I50.32: Primary | ICD-10-CM

## 2017-12-18 LAB
ALBUMIN SERPL BCG-MCNC: 3.5 GM/DL (ref 3.2–5.2)
ANION GAP SERPL CALC-SCNC: 7 MEQ/L (ref 8–16)
BUN SERPL-MCNC: 84 MG/DL (ref 7–18)
CALCIUM SERPL-MCNC: 9.2 MG/DL (ref 8.8–10.2)
CHLORIDE SERPL-SCNC: 103 MEQ/L (ref 98–107)
CO2 SERPL-SCNC: 26 MEQ/L (ref 21–32)
CREAT SERPL-MCNC: 2.51 MG/DL (ref 0.55–1.02)
GFR SERPL CREATININE-BSD FRML MDRD: 19.8 ML/MIN/{1.73_M2} (ref 39–?)
GLUCOSE SERPL-MCNC: 91 MG/DL (ref 83–110)
PHOSPHATE SERPL-MCNC: 5.8 MG/DL (ref 2.5–4.9)
POTASSIUM SERPL-SCNC: 5.6 MEQ/L (ref 3.5–5.1)
SODIUM SERPL-SCNC: 136 MEQ/L (ref 136–145)

## 2017-12-22 ENCOUNTER — HOSPITAL ENCOUNTER (OUTPATIENT)
Dept: HOSPITAL 53 - M LAB REF | Age: 77
End: 2017-12-22
Attending: INTERNAL MEDICINE
Payer: MEDICARE

## 2017-12-22 DIAGNOSIS — D50.0: ICD-10-CM

## 2017-12-22 DIAGNOSIS — N39.0: Primary | ICD-10-CM

## 2017-12-22 LAB
FERRITIN SERPL-MCNC: 49 NG/ML (ref 8–252)
IRON SATN MFR SERPL: 36.6 % (ref 13.2–45)
TIBC SERPL-MCNC: 377 UG/DL (ref 250–450)

## 2017-12-27 ENCOUNTER — HOSPITAL ENCOUNTER (OUTPATIENT)
Dept: HOSPITAL 53 - M WUC | Age: 77
End: 2017-12-27
Attending: FAMILY MEDICINE
Payer: MEDICARE

## 2017-12-27 DIAGNOSIS — D50.0: Primary | ICD-10-CM

## 2017-12-27 LAB
MEAN CORPUSCULAR HEMOGLOBIN: 31 PG (ref 27–33)
MEAN CORPUSCULAR HGB CONC: 31.5 G/DL (ref 32–36.5)
MEAN CORPUSCULAR VOLUME: 98.4 FL (ref 80–96)
NRBC BLD AUTO-RTO: 0 % (ref 0–0)
PLATELET COUNT, AUTOMATED: 308 10^3/UL (ref 150–450)
RED CELL DISTRIBUTION WIDTH: 18.7 % (ref 11.5–14.5)
WHITE BLOOD COUNT: 9.2 10^3/UL (ref 4–10)

## 2017-12-27 PROCEDURE — 85027 COMPLETE CBC AUTOMATED: CPT

## 2018-01-12 ENCOUNTER — HOSPITAL ENCOUNTER (OUTPATIENT)
Dept: HOSPITAL 53 - M SFHCPLAZ | Age: 78
End: 2018-01-12
Attending: NURSE PRACTITIONER
Payer: MEDICARE

## 2018-01-12 DIAGNOSIS — D50.0: Primary | ICD-10-CM

## 2018-01-12 LAB
BASO #: 0.1 10^3/UL (ref 0–0.2)
BASO %: 0.7 % (ref 0–1)
EOS #: 0.3 10^3/UL (ref 0–0.5)
EOSINOPHIL NFR BLD AUTO: 2.6 % (ref 0–3)
HEMATOCRIT: 30.6 % (ref 36–47)
HEMOGLOBIN: 9.4 G/DL (ref 12–16)
IMMATURE GRANULOCYTE #: 0.2 10^3/UL (ref 0–0)
IMMATURE GRANULOCYTE %: 2 % (ref 0–0)
LYMPH #: 1.8 10^3/UL (ref 1.5–4.5)
LYMPH %: 17.9 % (ref 24–44)
MEAN CORPUSCULAR HEMOGLOBIN: 30 PG (ref 27–33)
MEAN CORPUSCULAR HGB CONC: 30.7 G/DL (ref 32–36.5)
MEAN CORPUSCULAR VOLUME: 97.8 FL (ref 80–96)
MONO #: 1.1 10^3/UL (ref 0–0.8)
MONO %: 11.6 % (ref 0–5)
NEUTROPHILS #: 6.4 10^3/UL (ref 1.8–7.7)
NEUTROPHILS %: 65.2 % (ref 36–66)
NRBC BLD AUTO-RTO: 0.6 % (ref 0–0)
PLATELET COUNT, AUTOMATED: 309 10^3/UL (ref 150–450)
RED BLOOD COUNT: 3.13 10^6/UL (ref 4–5.4)
RED CELL DISTRIBUTION WIDTH: 17.6 % (ref 11.5–14.5)
WHITE BLOOD COUNT: 9.8 10^3/UL (ref 4–10)

## 2018-01-12 PROCEDURE — 85025 COMPLETE CBC W/AUTO DIFF WBC: CPT

## 2018-01-23 ENCOUNTER — HOSPITAL ENCOUNTER (OUTPATIENT)
Dept: HOSPITAL 53 - M RAD | Age: 78
End: 2018-01-23
Attending: SURGERY
Payer: MEDICARE

## 2018-01-23 DIAGNOSIS — Z01.818: Primary | ICD-10-CM

## 2018-01-23 DIAGNOSIS — N18.6: ICD-10-CM

## 2018-02-03 ENCOUNTER — HOSPITAL ENCOUNTER (INPATIENT)
Dept: HOSPITAL 53 - M ED | Age: 78
LOS: 5 days | DRG: 283 | End: 2018-02-08
Attending: FAMILY MEDICINE | Admitting: INTERNAL MEDICINE
Payer: MEDICARE

## 2018-02-03 DIAGNOSIS — I21.29: Primary | ICD-10-CM

## 2018-02-03 DIAGNOSIS — I73.9: ICD-10-CM

## 2018-02-03 DIAGNOSIS — R17: ICD-10-CM

## 2018-02-03 DIAGNOSIS — Z88.2: ICD-10-CM

## 2018-02-03 DIAGNOSIS — Z99.81: ICD-10-CM

## 2018-02-03 DIAGNOSIS — I50.31: ICD-10-CM

## 2018-02-03 DIAGNOSIS — J44.9: ICD-10-CM

## 2018-02-03 DIAGNOSIS — F32.9: ICD-10-CM

## 2018-02-03 DIAGNOSIS — Z95.2: ICD-10-CM

## 2018-02-03 DIAGNOSIS — N18.4: ICD-10-CM

## 2018-02-03 DIAGNOSIS — Z88.5: ICD-10-CM

## 2018-02-03 DIAGNOSIS — E78.5: ICD-10-CM

## 2018-02-03 DIAGNOSIS — I13.0: ICD-10-CM

## 2018-02-03 DIAGNOSIS — K92.2: ICD-10-CM

## 2018-02-03 DIAGNOSIS — I48.0: ICD-10-CM

## 2018-02-03 DIAGNOSIS — E11.9: ICD-10-CM

## 2018-02-03 DIAGNOSIS — J96.00: ICD-10-CM

## 2018-02-03 DIAGNOSIS — E87.5: ICD-10-CM

## 2018-02-03 DIAGNOSIS — K75.9: ICD-10-CM

## 2018-02-03 DIAGNOSIS — E87.2: ICD-10-CM

## 2018-02-03 DIAGNOSIS — D63.1: ICD-10-CM

## 2018-02-03 DIAGNOSIS — G47.33: ICD-10-CM

## 2018-02-03 DIAGNOSIS — N17.9: ICD-10-CM

## 2018-02-03 DIAGNOSIS — I25.10: ICD-10-CM

## 2018-02-03 DIAGNOSIS — Z88.8: ICD-10-CM

## 2018-02-03 DIAGNOSIS — R57.0: ICD-10-CM

## 2018-02-03 DIAGNOSIS — Z79.01: ICD-10-CM

## 2018-02-03 LAB
ABG BASE EXCESS: -11.8 (ref -2–2)
ABG BASE EXCESS: -14 (ref -2–2)
ABG BASE EXCESS: -14.5 (ref -2–2)
ABG BASE EXCESS: -14.9 (ref -2–2)
ABG DEVICE: (no result)
ABG HCO3: 12.9 MEQ/L (ref 22–26)
ABG HCO3: 13.6 MEQ/L (ref 22–26)
ABG HCO3: 14.3 MEQ/L (ref 22–26)
ABG HCO3: 14.5 MEQ/L (ref 22–26)
ABG O2 SATURATION: 84.7 % (ref 95–99)
ABG O2 SATURATION: 88.4 % (ref 95–99)
ABG O2 SATURATION: 92.5 % (ref 95–99)
ABG O2 SATURATION: 93.6 % (ref 95–99)
ABG PARTIAL PRESSURE CO2: 34.4 MMHG (ref 35–45)
ABG PARTIAL PRESSURE CO2: 36.1 MMHG (ref 35–45)
ABG PARTIAL PRESSURE CO2: 39 MMHG (ref 35–45)
ABG PARTIAL PRESSURE CO2: 49.6 MMHG (ref 35–45)
ABG PARTIAL PRESSURE O2: 63.5 MMHG (ref 75–100)
ABG PARTIAL PRESSURE O2: 69.7 MMHG (ref 75–100)
ABG PARTIAL PRESSURE O2: 80.1 MMHG (ref 75–100)
ABG PARTIAL PRESSURE O2: 88.1 MMHG (ref 75–100)
ABG PH (ARTERIAL): 7.08 UNITS (ref 7.35–7.45)
ABG PH (ARTERIAL): 7.16 UNITS (ref 7.35–7.45)
ABG PH (ARTERIAL): 7.17 UNITS (ref 7.35–7.45)
ABG PH (ARTERIAL): 7.24 UNITS (ref 7.35–7.45)
ABG STANDARD HCO3: 12.7 MEQ/L (ref 22–26)
ABG STANDARD HCO3: 12.8 MEQ/L (ref 22–26)
ABG STANDARD HCO3: 13.2 MEQ/L (ref 22–26)
ABG STANDARD HCO3: 14.9 MEQ/L (ref 22–26)
ABG TOTAL CO2: 14 MEQ/L (ref 23–31)
ABG TOTAL CO2: 14.8 MEQ/L (ref 23–31)
ABG TOTAL CO2: 15.6 MEQ/L (ref 23–31)
ABG TOTAL CO2: 15.8 MEQ/L (ref 23–31)
ACETAMINOPHEN LEVEL: < 2 UG/ML (ref 10–30)
ALBUMIN/GLOBULIN RATIO: 0.69 (ref 1–1.93)
ALBUMIN: 2.7 GM/DL (ref 3.2–5.2)
ALBUMIN: 3.3 GM/DL (ref 3.2–5.2)
ALKALINE PHOSPHATASE: 92 U/L (ref 45–117)
ALT SERPL W P-5'-P-CCNC: 276 U/L (ref 12–78)
AMMONIA PLAS-SCNC: 26 UMOL/L (ref ?–32)
AMPHETAMINES UR QL SCN: NEGATIVE
ANION GAP: 13 MEQ/L (ref 8–16)
ANION GAP: 18 MEQ/L (ref 8–16)
AST SERPL-CCNC: 398 U/L (ref 7–37)
BARBITURATES UR QL SCN: NEGATIVE
BASO #: 0.1 10^3/UL (ref 0–0.2)
BASO %: 0.3 % (ref 0–1)
BENZODIAZ UR QL SCN: NEGATIVE
BILIRUB CONJ SERPL-MCNC: 0.3 MG/DL (ref 0–0.2)
BILIRUBIN,TOTAL: 0.9 MG/DL (ref 0.2–1)
BLOOD UREA NITROGEN: 93 MG/DL (ref 7–18)
BLOOD UREA NITROGEN: 96 MG/DL (ref 7–18)
BZE UR QL SCN: NEGATIVE
CALCIUM LEVEL: 7.4 MG/DL (ref 8.8–10.2)
CALCIUM LEVEL: 8.7 MG/DL (ref 8.8–10.2)
CANNABINOIDS UR QL SCN: NEGATIVE
CARBON DIOXIDE LEVEL: 16 MEQ/L (ref 21–32)
CARBON DIOXIDE LEVEL: 17 MEQ/L (ref 21–32)
CHLORIDE LEVEL: 101 MEQ/L (ref 98–107)
CHLORIDE LEVEL: 97 MEQ/L (ref 98–107)
CK MB CFR.DF SERPL CALC: 1.75
CK MB CFR.DF SERPL CALC: 1.87
CK SERPL-CCNC: 1792 U/L (ref 26–192)
CK SERPL-CCNC: 2758 U/L (ref 26–192)
CK-MB VALUE MASS: 31.4 NG/ML (ref 0–3.6)
CK-MB VALUE MASS: 51.6 NG/ML (ref 0–3.6)
CREATININE FOR GFR: 3.56 MG/DL (ref 0.55–1.3)
CREATININE FOR GFR: 3.97 MG/DL (ref 0.55–1.3)
EOS #: 0 10^3/UL (ref 0–0.5)
EOSINOPHIL NFR BLD AUTO: 0.1 % (ref 0–3)
GFR SERPL CREATININE-BSD FRML MDRD: 11.7 ML/MIN/{1.73_M2} (ref 39–?)
GFR SERPL CREATININE-BSD FRML MDRD: 13.2 ML/MIN/{1.73_M2} (ref 39–?)
GLUCOSE BLDC GLUCOMTR-MCNC: 210 MG/DL (ref 83–110)
GLUCOSE BLDC GLUCOMTR-MCNC: 281 MG/DL (ref 83–110)
GLUCOSE, FASTING: 139 MG/DL (ref 70–100)
GLUCOSE, FASTING: 249 MG/DL (ref 70–100)
HEMATOCRIT: 23 % (ref 36–47)
HEMATOCRIT: 26.8 % (ref 36–47)
HEMOGLOBIN: 6.8 G/DL (ref 12–16)
HEMOGLOBIN: 7.9 G/DL (ref 12–16)
IMMATURE GRANULOCYTE #: 0.9 10^3/UL (ref 0–0)
IMMATURE GRANULOCYTE %: 5 % (ref 0–0)
IMMEDIATE SPIN CROSSMATCH: 1 2
INR: 2.32
LACTIC ACID SEPSIS PROTOCOL: 8 MMOL/L (ref 0.4–2)
LEUKOCYTE ESTERASE UR AUTO RFX: (no result)
LYMPH #: 1.2 10^3/UL (ref 1.5–4.5)
LYMPH %: 6.6 % (ref 24–44)
MAGNESIUM LEVEL: 2.6 MG/DL (ref 1.8–2.4)
MEAN CORPUSCULAR HEMOGLOBIN: 30.9 PG (ref 27–33)
MEAN CORPUSCULAR HEMOGLOBIN: 31.7 PG (ref 27–33)
MEAN CORPUSCULAR HGB CONC: 29.5 G/DL (ref 32–36.5)
MEAN CORPUSCULAR HGB CONC: 29.6 G/DL (ref 32–36.5)
MEAN CORPUSCULAR VOLUME: 104.5 FL (ref 80–96)
MEAN CORPUSCULAR VOLUME: 107.6 FL (ref 80–96)
METHADONE URINE: NEGATIVE
MONO #: 1.7 10^3/UL (ref 0–0.8)
MONO %: 9.2 % (ref 0–5)
NEUTROPHILS #: 14.1 10^3/UL (ref 1.8–7.7)
NEUTROPHILS %: 78.8 % (ref 36–66)
NRBC BLD AUTO-RTO: 2.1 % (ref 0–0)
NRBC BLD AUTO-RTO: 4.7 % (ref 0–0)
OPIATES UR QL SCN: NEGATIVE
PARTIAL THROMBOPLASTIN TIME: 38.3 SECONDS (ref 26.8–37.9)
PCP UR QL SCN: NEGATIVE
PHOSPHORUS LEVEL: 8.2 MG/DL (ref 2.5–4.9)
PLATELET COUNT, AUTOMATED: 276 10^3/UL (ref 150–450)
PLATELET COUNT, AUTOMATED: 351 10^3/UL (ref 150–450)
POTASSIUM SERUM: 5.5 MEQ/L (ref 3.5–5.1)
POTASSIUM SERUM: 7 MEQ/L (ref 3.5–5.1)
PROTHROMBIN TIME: 26.4 SECONDS (ref 12.4–14.5)
RED BLOOD COUNT: 2.2 10^6/UL (ref 4–5.4)
RED BLOOD COUNT: 2.49 10^6/UL (ref 4–5.4)
RED CELL DISTRIBUTION WIDTH: 19.2 % (ref 11.5–14.5)
RED CELL DISTRIBUTION WIDTH: 19.2 % (ref 11.5–14.5)
SALICYLATE LEVEL: < 1.7 MG/DL (ref 5–30)
SODIUM LEVEL: 131 MEQ/L (ref 136–145)
SODIUM LEVEL: 131 MEQ/L (ref 136–145)
SPECIFIC GRAVITY UR AUTO RFX: 1.01 (ref 1–1.03)
SQUAM EPITHELIAL CELL UR AURFX: 2 /HPF (ref 0–6)
THYROID STIMULATING HORMONE: 2.92 UIU/ML (ref 0.36–3.74)
TOTAL PROTEIN: 8.1 GM/DL (ref 6.4–8.2)
TROPONIN I: 4.55 NG/ML (ref ?–0.1)
TROPONIN I: 6.09 NG/ML (ref ?–0.1)
WHITE BLOOD COUNT: 15.7 10^3/UL (ref 4–10)
WHITE BLOOD COUNT: 17.9 10^3/UL (ref 4–10)

## 2018-02-03 PROCEDURE — 5A1945Z RESPIRATORY VENTILATION, 24-96 CONSECUTIVE HOURS: ICD-10-PCS

## 2018-02-03 PROCEDURE — 02HV33Z INSERTION OF INFUSION DEVICE INTO SUPERIOR VENA CAVA, PERCUTANEOUS APPROACH: ICD-10-PCS

## 2018-02-03 PROCEDURE — 30233N1 TRANSFUSION OF NONAUTOLOGOUS RED BLOOD CELLS INTO PERIPHERAL VEIN, PERCUTANEOUS APPROACH: ICD-10-PCS

## 2018-02-03 RX ADMIN — INSULIN LISPRO 6 UNITS: 100 INJECTION, SOLUTION INTRAVENOUS; SUBCUTANEOUS at 23:52

## 2018-02-03 RX ADMIN — IPRATROPIUM BROMIDE AND ALBUTEROL SULFATE 1 ML: .5; 3 SOLUTION RESPIRATORY (INHALATION) at 20:04

## 2018-02-03 RX ADMIN — LORAZEPAM 1 MG: 2 INJECTION INTRAMUSCULAR; INTRAVENOUS at 13:55

## 2018-02-03 RX ADMIN — CLOPIDOGREL BISULFATE 1 MG: 75 TABLET ORAL at 17:21

## 2018-02-03 RX ADMIN — ATORVASTATIN CALCIUM 1 MG: 20 TABLET, FILM COATED ORAL at 22:54

## 2018-02-03 RX ADMIN — DOBUTAMINE HYDROCHLORIDE 1 MLS/HR: 200 INJECTION INTRAVENOUS at 13:55

## 2018-02-03 RX ADMIN — SODIUM CHLORIDE 1 MLS/HR: 9 INJECTION, SOLUTION INTRAVENOUS at 14:30

## 2018-02-03 RX ADMIN — LORAZEPAM 1 MG: 2 INJECTION INTRAMUSCULAR; INTRAVENOUS at 14:05

## 2018-02-03 RX ADMIN — DEXTROSE MONOHYDRATE 1 MLS/HR: 50 INJECTION, SOLUTION INTRAVENOUS at 19:37

## 2018-02-03 RX ADMIN — CLOPIDOGREL BISULFATE 1 MG: 75 TABLET ORAL at 17:51

## 2018-02-03 RX ADMIN — WATER 1 MLS/HR: 100 INJECTION, SOLUTION INTRAVENOUS at 19:37

## 2018-02-03 RX ADMIN — CHLORHEXIDINE GLUCONATE 1 ML: 1.2 RINSE ORAL at 22:54

## 2018-02-03 RX ADMIN — DEXTROSE MONOHYDRATE 1 MLS/HR: 50 INJECTION, SOLUTION INTRAVENOUS at 15:01

## 2018-02-03 RX ADMIN — SODIUM CHLORIDE 1 MLS/HR: 9 INJECTION, SOLUTION INTRAVENOUS at 13:33

## 2018-02-03 RX ADMIN — HEPARIN SODIUM AND DEXTROSE 1 MLS/HR: 10000; 5 INJECTION INTRAVENOUS at 18:05

## 2018-02-03 RX ADMIN — IPRATROPIUM BROMIDE AND ALBUTEROL SULFATE 1 ML: .5; 3 SOLUTION RESPIRATORY (INHALATION) at 17:34

## 2018-02-03 RX ADMIN — INSULIN LISPRO 8 UNITS: 100 INJECTION, SOLUTION INTRAVENOUS; SUBCUTANEOUS at 18:05

## 2018-02-03 RX ADMIN — SODIUM CHLORIDE 1 MLS/HR: 9 INJECTION, SOLUTION INTRAVENOUS at 13:15

## 2018-02-03 RX ADMIN — ASPIRIN 325 MG ORAL TABLET 1 MG: 325 PILL ORAL at 17:22

## 2018-02-04 LAB
ABG BASE EXCESS: -1 (ref -2–2)
ABG BASE EXCESS: -2.7 (ref -2–2)
ABG HCO3: 21.7 MEQ/L (ref 22–26)
ABG HCO3: 22.8 MEQ/L (ref 22–26)
ABG O2 SATURATION: 95.6 % (ref 95–99)
ABG O2 SATURATION: 98.4 % (ref 95–99)
ABG PARTIAL PRESSURE CO2: 34.8 MMHG (ref 35–45)
ABG PARTIAL PRESSURE CO2: 35.7 MMHG (ref 35–45)
ABG PARTIAL PRESSURE O2: 103.6 MMHG (ref 75–100)
ABG PARTIAL PRESSURE O2: 79.6 MMHG (ref 75–100)
ABG PH (ARTERIAL): 7.4 UNITS (ref 7.35–7.45)
ABG PH (ARTERIAL): 7.43 UNITS (ref 7.35–7.45)
ABG STANDARD HCO3: 22.2 MEQ/L (ref 22–26)
ABG STANDARD HCO3: 23.6 MEQ/L (ref 22–26)
ABG TOTAL CO2: 22.8 MEQ/L (ref 23–31)
ABG TOTAL CO2: 23.9 MEQ/L (ref 23–31)
ALBUMIN/GLOBULIN RATIO: 0.66 (ref 1–1.93)
ALBUMIN: 2.7 GM/DL (ref 3.2–5.2)
ALKALINE PHOSPHATASE: 77 U/L (ref 45–117)
ALT SERPL W P-5'-P-CCNC: 2416 U/L (ref 12–78)
ANION GAP: 10 MEQ/L (ref 8–16)
AST SERPL-CCNC: 3245 U/L (ref 7–37)
BASO #: 0 10^3/UL (ref 0–0.2)
BASO %: 0.2 % (ref 0–1)
BILIRUBIN,TOTAL: 3.4 MG/DL (ref 0.2–1)
BLOOD UREA NITROGEN: 94 MG/DL (ref 7–18)
CALCIUM LEVEL: 7.5 MG/DL (ref 8.8–10.2)
CARBON DIOXIDE LEVEL: 23 MEQ/L (ref 21–32)
CHLORIDE LEVEL: 97 MEQ/L (ref 98–107)
CHOLESTEROL LEVEL: 52 MG/DL (ref ?–200)
CK SERPL-CCNC: 9021 U/L (ref 26–192)
CREATININE FOR GFR: 3.03 MG/DL (ref 0.55–1.3)
EOS #: 0 10^3/UL (ref 0–0.5)
EOSINOPHIL NFR BLD AUTO: 0.1 % (ref 0–3)
GFR SERPL CREATININE-BSD FRML MDRD: 15.9 ML/MIN/{1.73_M2} (ref 39–?)
GLUCOSE BLDC GLUCOMTR-MCNC: 160 MG/DL (ref 83–110)
GLUCOSE BLDC GLUCOMTR-MCNC: 171 MG/DL (ref 83–110)
GLUCOSE, FASTING: 158 MG/DL (ref 70–100)
HEMATOCRIT: 25.4 % (ref 36–47)
HEMATOCRIT: 27.6 % (ref 36–47)
HEMOGLOBIN: 8.2 G/DL (ref 12–16)
HEMOGLOBIN: 8.9 G/DL (ref 12–16)
IMMATURE GRANULOCYTE #: 0.3 10^3/UL (ref 0–0)
IMMATURE GRANULOCYTE %: 1.7 % (ref 0–0)
INR: 3.46
LDH SERPL L TO P-CCNC: 3759 U/L (ref 84–246)
LYMPH #: 0.5 10^3/UL (ref 1.5–4.5)
LYMPH %: 2.6 % (ref 24–44)
MAGNESIUM LEVEL: 2.3 MG/DL (ref 1.8–2.4)
MEAN CORPUSCULAR HEMOGLOBIN: 30.9 PG (ref 27–33)
MEAN CORPUSCULAR HGB CONC: 32.2 G/DL (ref 32–36.5)
MEAN CORPUSCULAR VOLUME: 95.8 FL (ref 80–96)
MONO #: 0.9 10^3/UL (ref 0–0.8)
MONO %: 4.9 % (ref 0–5)
NEUTROPHILS #: 17.1 10^3/UL (ref 1.8–7.7)
NEUTROPHILS %: 90.5 % (ref 36–66)
NRBC BLD AUTO-RTO: 2.3 % (ref 0–0)
PARTIAL THROMBOPLASTIN TIME: 134 SECONDS (ref 26.8–37.9)
PARTIAL THROMBOPLASTIN TIME: 151.6 SECONDS (ref 26.8–37.9)
PARTIAL THROMBOPLASTIN TIME: 189.4 SECONDS (ref 26.8–37.9)
PHOSPHORUS LEVEL: 5.6 MG/DL (ref 2.5–4.9)
PLATELET COUNT, AUTOMATED: 243 10^3/UL (ref 150–450)
POTASSIUM SERUM: 4.6 MEQ/L (ref 3.5–5.1)
PROTHROMBIN TIME: 36.5 SECONDS (ref 12.4–14.5)
RED BLOOD COUNT: 2.88 10^6/UL (ref 4–5.4)
RED CELL DISTRIBUTION WIDTH: 18.6 % (ref 11.5–14.5)
SODIUM LEVEL: 130 MEQ/L (ref 136–145)
TOTAL PROTEIN: 6.8 GM/DL (ref 6.4–8.2)
TRIGLYCERIDES LEVEL: 60 MG/DL (ref ?–150)
WHITE BLOOD COUNT: 18.9 10^3/UL (ref 4–10)

## 2018-02-04 RX ADMIN — NYSTATIN 1 DOSE: 100000 POWDER TOPICAL at 21:18

## 2018-02-04 RX ADMIN — CHLORHEXIDINE GLUCONATE 1 ML: 1.2 RINSE ORAL at 09:26

## 2018-02-04 RX ADMIN — METOPROLOL TARTRATE 1 MG: 25 TABLET, FILM COATED ORAL at 21:17

## 2018-02-04 RX ADMIN — ATORVASTATIN CALCIUM 1 MG: 20 TABLET, FILM COATED ORAL at 21:17

## 2018-02-04 RX ADMIN — NYSTATIN 1 DOSE: 100000 POWDER TOPICAL at 14:07

## 2018-02-04 RX ADMIN — IPRATROPIUM BROMIDE AND ALBUTEROL SULFATE 1 ML: .5; 3 SOLUTION RESPIRATORY (INHALATION) at 08:31

## 2018-02-04 RX ADMIN — HEPARIN SODIUM AND DEXTROSE 1 MLS/HR: 10000; 5 INJECTION INTRAVENOUS at 15:23

## 2018-02-04 RX ADMIN — METOPROLOL TARTRATE 1 MG: 25 TABLET, FILM COATED ORAL at 14:07

## 2018-02-04 RX ADMIN — MIDAZOLAM 1 MG: 1 INJECTION INTRAMUSCULAR; INTRAVENOUS at 05:43

## 2018-02-04 RX ADMIN — CHLORHEXIDINE GLUCONATE 1 ML: 1.2 RINSE ORAL at 21:00

## 2018-02-04 RX ADMIN — IPRATROPIUM BROMIDE AND ALBUTEROL SULFATE 1 ML: .5; 3 SOLUTION RESPIRATORY (INHALATION) at 11:24

## 2018-02-04 RX ADMIN — DEXTROSE MONOHYDRATE 1 MG: 50 INJECTION, SOLUTION INTRAVENOUS at 09:25

## 2018-02-04 RX ADMIN — INSULIN LISPRO 1 UNITS: 100 INJECTION, SOLUTION INTRAVENOUS; SUBCUTANEOUS at 12:00

## 2018-02-04 RX ADMIN — IPRATROPIUM BROMIDE AND ALBUTEROL SULFATE 1 ML: .5; 3 SOLUTION RESPIRATORY (INHALATION) at 19:47

## 2018-02-04 RX ADMIN — MORPHINE SULFATE 1 MG: 2 INJECTION, SOLUTION INTRAMUSCULAR; INTRAVENOUS at 07:31

## 2018-02-04 RX ADMIN — Medication 1 MLS/HR: at 09:19

## 2018-02-04 RX ADMIN — Medication 1: at 11:05

## 2018-02-04 RX ADMIN — MIDAZOLAM 1 MG: 1 INJECTION INTRAMUSCULAR; INTRAVENOUS at 09:25

## 2018-02-04 RX ADMIN — INSULIN LISPRO 4 UNITS: 100 INJECTION, SOLUTION INTRAVENOUS; SUBCUTANEOUS at 06:45

## 2018-02-04 RX ADMIN — Medication 1: at 19:06

## 2018-02-04 RX ADMIN — Medication 1: at 02:09

## 2018-02-04 RX ADMIN — ASPIRIN 325 MG ORAL TABLET 1 MG: 325 PILL ORAL at 09:25

## 2018-02-04 RX ADMIN — Medication 1 MLS/HR: at 12:17

## 2018-02-04 RX ADMIN — CLOPIDOGREL BISULFATE 1 MG: 75 TABLET ORAL at 09:26

## 2018-02-04 RX ADMIN — INSULIN LISPRO 1 UNITS: 100 INJECTION, SOLUTION INTRAVENOUS; SUBCUTANEOUS at 16:50

## 2018-02-04 RX ADMIN — IPRATROPIUM BROMIDE AND ALBUTEROL SULFATE 1 ML: .5; 3 SOLUTION RESPIRATORY (INHALATION) at 15:56

## 2018-02-05 LAB
ABG BASE EXCESS: -1.7 (ref -2–2)
ABG HCO3: 22.3 MEQ/L (ref 22–26)
ABG O2 SATURATION: 95.4 % (ref 95–99)
ABG PARTIAL PRESSURE CO2: 34.3 MMHG (ref 35–45)
ABG PARTIAL PRESSURE O2: 82.2 MMHG (ref 75–100)
ABG PH (ARTERIAL): 7.43 UNITS (ref 7.35–7.45)
ABG STANDARD HCO3: 23 MEQ/L (ref 22–26)
ABG TOTAL CO2: 23.4 MEQ/L (ref 23–31)
ALBUMIN/GLOBULIN RATIO: 0.76 (ref 1–1.93)
ALBUMIN: 2.6 GM/DL (ref 3.2–5.2)
ALKALINE PHOSPHATASE: 80 U/L (ref 45–117)
ALT SERPL W P-5'-P-CCNC: 2253 U/L (ref 12–78)
ANION GAP: 13 MEQ/L (ref 8–16)
AST SERPL-CCNC: 1930 U/L (ref 7–37)
BASO #: 0 10^3/UL (ref 0–0.2)
BASO %: 0.2 % (ref 0–1)
BILIRUBIN,TOTAL: 4.6 MG/DL (ref 0.2–1)
BLOOD UREA NITROGEN: 124 MG/DL (ref 7–18)
CALCIUM LEVEL: 7.1 MG/DL (ref 8.8–10.2)
CARBON DIOXIDE LEVEL: 23 MEQ/L (ref 21–32)
CHLORIDE LEVEL: 96 MEQ/L (ref 98–107)
CHOLESTEROL LEVEL: 54 MG/DL (ref ?–200)
CK SERPL-CCNC: 6646 U/L (ref 26–192)
CREATININE FOR GFR: 3.18 MG/DL (ref 0.55–1.3)
EOS #: 0 10^3/UL (ref 0–0.5)
EOSINOPHIL NFR BLD AUTO: 0 % (ref 0–3)
GFR SERPL CREATININE-BSD FRML MDRD: 15.1 ML/MIN/{1.73_M2} (ref 39–?)
GLUCOSE BLDC GLUCOMTR-MCNC: 132 MG/DL (ref 83–110)
GLUCOSE BLDC GLUCOMTR-MCNC: 141 MG/DL (ref 83–110)
GLUCOSE BLDC GLUCOMTR-MCNC: 146 MG/DL (ref 83–110)
GLUCOSE BLDC GLUCOMTR-MCNC: 151 MG/DL (ref 83–110)
GLUCOSE, FASTING: 139 MG/DL (ref 70–100)
HEMATOCRIT: 23 % (ref 36–47)
HEMATOCRIT: 26.7 % (ref 36–47)
HEMOGLOBIN: 7.5 G/DL (ref 12–16)
HEMOGLOBIN: 8.9 G/DL (ref 12–16)
IMMATURE GRANULOCYTE #: 0.4 10^3/UL (ref 0–0)
IMMATURE GRANULOCYTE %: 1.5 % (ref 0–0)
IMMEDIATE SPIN CROSSMATCH: 1 2
INR: 5.74
LDH SERPL L TO P-CCNC: 2115 U/L (ref 84–246)
LYMPH #: 0.6 10^3/UL (ref 1.5–4.5)
LYMPH %: 2.5 % (ref 24–44)
MAGNESIUM LEVEL: 2.1 MG/DL (ref 1.8–2.4)
MEAN CORPUSCULAR HEMOGLOBIN: 30.9 PG (ref 27–33)
MEAN CORPUSCULAR HEMOGLOBIN: 31 PG (ref 27–33)
MEAN CORPUSCULAR HGB CONC: 32.6 G/DL (ref 32–36.5)
MEAN CORPUSCULAR HGB CONC: 33.3 G/DL (ref 32–36.5)
MEAN CORPUSCULAR VOLUME: 93 FL (ref 80–96)
MEAN CORPUSCULAR VOLUME: 94.7 FL (ref 80–96)
MONO #: 1.2 10^3/UL (ref 0–0.8)
MONO %: 5.1 % (ref 0–5)
NEUTROPHILS #: 21.2 10^3/UL (ref 1.8–7.7)
NEUTROPHILS %: 90.7 % (ref 36–66)
NRBC BLD AUTO-RTO: 1.9 % (ref 0–0)
NRBC BLD AUTO-RTO: 3.3 % (ref 0–0)
PARTIAL THROMBOPLASTIN TIME: 77.2 SECONDS (ref 26.8–37.9)
PARTIAL THROMBOPLASTIN TIME: 79 SECONDS (ref 26.8–37.9)
PARTIAL THROMBOPLASTIN TIME: 79.9 SECONDS (ref 26.8–37.9)
PARTIAL THROMBOPLASTIN TIME: 90.8 SECONDS (ref 26.8–37.9)
PHOSPHORUS LEVEL: 5.3 MG/DL (ref 2.5–4.9)
PLATELET COUNT, AUTOMATED: 248 10^3/UL (ref 150–450)
PLATELET COUNT, AUTOMATED: 266 10^3/UL (ref 150–450)
POTASSIUM SERUM: 3.9 MEQ/L (ref 3.5–5.1)
PROTHROMBIN TIME: 55.1 SECONDS (ref 12.4–14.5)
RED BLOOD COUNT: 2.43 10^6/UL (ref 4–5.4)
RED BLOOD COUNT: 2.87 10^6/UL (ref 4–5.4)
RED CELL DISTRIBUTION WIDTH: 18.3 % (ref 11.5–14.5)
RED CELL DISTRIBUTION WIDTH: 19.6 % (ref 11.5–14.5)
SODIUM LEVEL: 132 MEQ/L (ref 136–145)
TOTAL PROTEIN: 6 GM/DL (ref 6.4–8.2)
TRIGLYCERIDES LEVEL: 120 MG/DL (ref ?–150)
WHITE BLOOD COUNT: 22.8 10^3/UL (ref 4–10)
WHITE BLOOD COUNT: 23.3 10^3/UL (ref 4–10)

## 2018-02-05 RX ADMIN — GABAPENTIN 1 MG: 300 CAPSULE ORAL at 20:26

## 2018-02-05 RX ADMIN — WARFARIN SODIUM 1 MG: 2 TABLET ORAL at 17:00

## 2018-02-05 RX ADMIN — DEXTROSE MONOHYDRATE 1 MG: 50 INJECTION, SOLUTION INTRAVENOUS at 10:35

## 2018-02-05 RX ADMIN — CHLORHEXIDINE GLUCONATE 1 ML: 1.2 RINSE ORAL at 09:00

## 2018-02-05 RX ADMIN — NYSTATIN 1 DOSE: 100000 POWDER TOPICAL at 10:35

## 2018-02-05 RX ADMIN — INSULIN LISPRO 2 UNITS: 100 INJECTION, SOLUTION INTRAVENOUS; SUBCUTANEOUS at 12:10

## 2018-02-05 RX ADMIN — ASPIRIN 325 MG ORAL TABLET 1 MG: 325 PILL ORAL at 10:35

## 2018-02-05 RX ADMIN — GABAPENTIN 1 MG: 300 CAPSULE ORAL at 15:12

## 2018-02-05 RX ADMIN — METOPROLOL TARTRATE 1 MG: 25 TABLET, FILM COATED ORAL at 06:37

## 2018-02-05 RX ADMIN — CLOPIDOGREL BISULFATE 1 MG: 75 TABLET ORAL at 10:35

## 2018-02-05 RX ADMIN — IPRATROPIUM BROMIDE AND ALBUTEROL SULFATE 1 ML: .5; 3 SOLUTION RESPIRATORY (INHALATION) at 12:27

## 2018-02-05 RX ADMIN — ATORVASTATIN CALCIUM 1 MG: 20 TABLET, FILM COATED ORAL at 20:26

## 2018-02-05 RX ADMIN — INSULIN LISPRO 2 UNITS: 100 INJECTION, SOLUTION INTRAVENOUS; SUBCUTANEOUS at 17:42

## 2018-02-05 RX ADMIN — IPRATROPIUM BROMIDE AND ALBUTEROL SULFATE 1 ML: .5; 3 SOLUTION RESPIRATORY (INHALATION) at 16:00

## 2018-02-05 RX ADMIN — NYSTATIN 1 DOSE: 100000 POWDER TOPICAL at 20:27

## 2018-02-05 RX ADMIN — INSULIN LISPRO 1 UNITS: 100 INJECTION, SOLUTION INTRAVENOUS; SUBCUTANEOUS at 00:00

## 2018-02-05 RX ADMIN — IPRATROPIUM BROMIDE AND ALBUTEROL SULFATE 1 ML: .5; 3 SOLUTION RESPIRATORY (INHALATION) at 19:41

## 2018-02-05 RX ADMIN — WARFARIN SODIUM 1 MG: 2 TABLET ORAL at 17:41

## 2018-02-05 RX ADMIN — IPRATROPIUM BROMIDE AND ALBUTEROL SULFATE 1 ML: .5; 3 SOLUTION RESPIRATORY (INHALATION) at 07:09

## 2018-02-05 RX ADMIN — INSULIN LISPRO 1 UNITS: 100 INJECTION, SOLUTION INTRAVENOUS; SUBCUTANEOUS at 20:26

## 2018-02-05 RX ADMIN — INSULIN LISPRO 1 UNITS: 100 INJECTION, SOLUTION INTRAVENOUS; SUBCUTANEOUS at 06:00

## 2018-02-05 RX ADMIN — SUCRALFATE 1 GM: 1 TABLET ORAL at 17:41

## 2018-02-06 LAB
ABG BASE EXCESS: -1.7 (ref -2–2)
ABG HCO3: 21.6 MEQ/L (ref 22–26)
ABG O2 SATURATION: 91.6 % (ref 95–99)
ABG PARTIAL PRESSURE CO2: 31.1 MMHG (ref 35–45)
ABG PARTIAL PRESSURE O2: 64.6 MMHG (ref 75–100)
ABG PH (ARTERIAL): 7.46 UNITS (ref 7.35–7.45)
ABG STANDARD HCO3: 23 MEQ/L (ref 22–26)
ABG TOTAL CO2: 22.6 MEQ/L (ref 23–31)
ALBUMIN/GLOBULIN RATIO: 0.73 (ref 1–1.93)
ALBUMIN: 2.4 GM/DL (ref 3.2–5.2)
ALKALINE PHOSPHATASE: 79 U/L (ref 45–117)
ALT SERPL W P-5'-P-CCNC: 1554 U/L (ref 12–78)
ANION GAP: 11 MEQ/L (ref 8–16)
AST SERPL-CCNC: 925 U/L (ref 7–37)
BASO #: 0 10^3/UL (ref 0–0.2)
BASO %: 0.2 % (ref 0–1)
BILIRUBIN,TOTAL: 2.4 MG/DL (ref 0.2–1)
BLOOD UREA NITROGEN: 147 MG/DL (ref 7–18)
CALCIUM LEVEL: 7.1 MG/DL (ref 8.8–10.2)
CARBON DIOXIDE LEVEL: 26 MEQ/L (ref 21–32)
CHLORIDE LEVEL: 97 MEQ/L (ref 98–107)
CHOLESTEROL LEVEL: 56 MG/DL (ref ?–200)
CK SERPL-CCNC: 2539 U/L (ref 26–192)
CREATININE FOR GFR: 2.84 MG/DL (ref 0.55–1.3)
EOS #: 0 10^3/UL (ref 0–0.5)
EOSINOPHIL NFR BLD AUTO: 0 % (ref 0–3)
EST. AVERAGE GLUCOSE BLD GHB EST-MCNC: 100 MG/DL (ref 60–110)
GFR SERPL CREATININE-BSD FRML MDRD: 17.2 ML/MIN/{1.73_M2} (ref 39–?)
GLUCOSE BLDC GLUCOMTR-MCNC: 162 MG/DL (ref 83–110)
GLUCOSE BLDC GLUCOMTR-MCNC: 180 MG/DL (ref 83–110)
GLUCOSE BLDC GLUCOMTR-MCNC: 194 MG/DL (ref 83–110)
GLUCOSE, FASTING: 141 MG/DL (ref 70–100)
HEMATOCRIT: 24.5 % (ref 36–47)
HEMATOCRIT: 24.6 % (ref 36–47)
HEMATOCRIT: 25.6 % (ref 36–47)
HEMOGLOBIN: 8.1 G/DL (ref 12–16)
HEMOGLOBIN: 8.2 G/DL (ref 12–16)
HEMOGLOBIN: 8.4 G/DL (ref 12–16)
IMMATURE GRANULOCYTE #: 0.4 10^3/UL (ref 0–0)
IMMATURE GRANULOCYTE %: 2 % (ref 0–0)
IMMEDIATE SPIN CROSSMATCH: 1 1
INR: 5.66
LDH SERPL L TO P-CCNC: 943 U/L (ref 84–246)
LYMPH #: 0.5 10^3/UL (ref 1.5–4.5)
LYMPH %: 2.6 % (ref 24–44)
MAGNESIUM LEVEL: 2 MG/DL (ref 1.8–2.4)
MEAN CORPUSCULAR HEMOGLOBIN: 31.1 PG (ref 27–33)
MEAN CORPUSCULAR HGB CONC: 33.5 G/DL (ref 32–36.5)
MEAN CORPUSCULAR VOLUME: 92.8 FL (ref 80–96)
MONO #: 1.6 10^3/UL (ref 0–0.8)
MONO %: 7.4 % (ref 0–5)
NEUTROPHILS #: 18.6 10^3/UL (ref 1.8–7.7)
NEUTROPHILS %: 87.8 % (ref 36–66)
NRBC BLD AUTO-RTO: 1.9 % (ref 0–0)
PARTIAL THROMBOPLASTIN TIME: 47.9 SECONDS (ref 26.8–37.9)
PHOSPHORUS LEVEL: 4.1 MG/DL (ref 2.5–4.9)
PLATELET COUNT, AUTOMATED: 227 10^3/UL (ref 150–450)
POTASSIUM SERUM: 3.3 MEQ/L (ref 3.5–5.1)
PROTHROMBIN TIME: 54.4 SECONDS (ref 12.4–14.5)
RED BLOOD COUNT: 2.64 10^6/UL (ref 4–5.4)
RED CELL DISTRIBUTION WIDTH: 18.8 % (ref 11.5–14.5)
SODIUM LEVEL: 134 MEQ/L (ref 136–145)
TOTAL PROTEIN: 5.7 GM/DL (ref 6.4–8.2)
TRIGLYCERIDES LEVEL: 145 MG/DL (ref ?–150)
TROPONIN I: 4.59 NG/ML (ref ?–0.1)
WHITE BLOOD COUNT: 21.1 10^3/UL (ref 4–10)

## 2018-02-06 RX ADMIN — POTASSIUM CHLORIDE 1 MEQ: 750 TABLET, EXTENDED RELEASE ORAL at 13:32

## 2018-02-06 RX ADMIN — IPRATROPIUM BROMIDE AND ALBUTEROL SULFATE 1 ML: .5; 3 SOLUTION RESPIRATORY (INHALATION) at 07:50

## 2018-02-06 RX ADMIN — INSULIN LISPRO 2 UNITS: 100 INJECTION, SOLUTION INTRAVENOUS; SUBCUTANEOUS at 08:30

## 2018-02-06 RX ADMIN — INSULIN LISPRO 4 UNITS: 100 INJECTION, SOLUTION INTRAVENOUS; SUBCUTANEOUS at 17:04

## 2018-02-06 RX ADMIN — SUCRALFATE 1 GM: 1 TABLET ORAL at 12:07

## 2018-02-06 RX ADMIN — GABAPENTIN 1 MG: 300 CAPSULE ORAL at 08:30

## 2018-02-06 RX ADMIN — INSULIN LISPRO 1 UNITS: 100 INJECTION, SOLUTION INTRAVENOUS; SUBCUTANEOUS at 21:00

## 2018-02-06 RX ADMIN — SUCRALFATE 1 GM: 1 TABLET ORAL at 08:30

## 2018-02-06 RX ADMIN — IPRATROPIUM BROMIDE AND ALBUTEROL SULFATE 1 ML: .5; 3 SOLUTION RESPIRATORY (INHALATION) at 19:46

## 2018-02-06 RX ADMIN — IPRATROPIUM BROMIDE AND ALBUTEROL SULFATE 1 ML: .5; 3 SOLUTION RESPIRATORY (INHALATION) at 15:49

## 2018-02-06 RX ADMIN — SUCRALFATE 1 GM: 1 TABLET ORAL at 17:04

## 2018-02-06 RX ADMIN — DEXTROSE MONOHYDRATE 1 MG: 50 INJECTION, SOLUTION INTRAVENOUS at 08:29

## 2018-02-06 RX ADMIN — IPRATROPIUM BROMIDE AND ALBUTEROL SULFATE 1 ML: .5; 3 SOLUTION RESPIRATORY (INHALATION) at 11:33

## 2018-02-06 RX ADMIN — ATORVASTATIN CALCIUM 1 MG: 20 TABLET, FILM COATED ORAL at 20:04

## 2018-02-06 RX ADMIN — NYSTATIN 1 DOSE: 100000 POWDER TOPICAL at 20:05

## 2018-02-06 RX ADMIN — NYSTATIN 1 DOSE: 100000 POWDER TOPICAL at 08:31

## 2018-02-06 RX ADMIN — INSULIN LISPRO 4 UNITS: 100 INJECTION, SOLUTION INTRAVENOUS; SUBCUTANEOUS at 12:07

## 2018-02-06 RX ADMIN — ASPIRIN 1 MG: 81 TABLET ORAL at 08:30

## 2018-02-07 LAB
ABG BASE EXCESS: -3.2 (ref -2–2)
ABG BASE EXCESS: -5.3 (ref -2–2)
ABG HCO3: 20.7 MEQ/L (ref 22–26)
ABG HCO3: 25 MEQ/L (ref 22–26)
ABG O2 SATURATION: 89.9 % (ref 95–99)
ABG O2 SATURATION: 90.9 % (ref 95–99)
ABG PARTIAL PRESSURE CO2: 43 MMHG (ref 35–45)
ABG PARTIAL PRESSURE CO2: 62.7 MMHG (ref 35–45)
ABG PARTIAL PRESSURE O2: 63.3 MMHG (ref 75–100)
ABG PARTIAL PRESSURE O2: 72.8 MMHG (ref 75–100)
ABG PH (ARTERIAL): 7.22 UNITS (ref 7.35–7.45)
ABG PH (ARTERIAL): 7.3 UNITS (ref 7.35–7.45)
ABG STANDARD HCO3: 19.9 MEQ/L (ref 22–26)
ABG STANDARD HCO3: 21.7 MEQ/L (ref 22–26)
ABG TOTAL CO2: 22.1 MEQ/L (ref 23–31)
ABG TOTAL CO2: 27 MEQ/L (ref 23–31)
ADD MANUAL DIFFER: YES
ALBUMIN/GLOBULIN RATIO: 0.56 (ref 1–1.93)
ALBUMIN/GLOBULIN RATIO: 0.62 (ref 1–1.93)
ALBUMIN: 1.6 GM/DL (ref 3.2–5.2)
ALBUMIN: 2.3 GM/DL (ref 3.2–5.2)
ALKALINE PHOSPHATASE: 94 U/L (ref 45–117)
ALKALINE PHOSPHATASE: 95 U/L (ref 45–117)
ALT SERPL W P-5'-P-CCNC: 665 U/L (ref 12–78)
ALT SERPL W P-5'-P-CCNC: 984 U/L (ref 12–78)
ANION GAP: 10 MEQ/L (ref 8–16)
ANION GAP: 14 MEQ/L (ref 8–16)
ANION GAP: 8 MEQ/L (ref 8–16)
ANISOCYTOSIS: (no result)
AST SERPL-CCNC: 393 U/L (ref 7–37)
AST SERPL-CCNC: 403 U/L (ref 7–37)
BANDS: 10 % (ref ?–11)
BASO #: 0.1 10^3/UL (ref 0–0.2)
BASO %: 0.2 % (ref 0–1)
BASOPHILIC STIPPLING: (no result)
BILIRUBIN,TOTAL: 1.1 MG/DL (ref 0.2–1)
BILIRUBIN,TOTAL: 1.7 MG/DL (ref 0.2–1)
BLOOD UREA NITROGEN: 153 MG/DL (ref 7–18)
BLOOD UREA NITROGEN: 157 MG/DL (ref 7–18)
BLOOD UREA NITROGEN: 173 MG/DL (ref 7–18)
BURR CELLS: (no result)
CALCIUM LEVEL: 6.6 MG/DL (ref 8.8–10.2)
CALCIUM LEVEL: 7.9 MG/DL (ref 8.8–10.2)
CALCIUM LEVEL: 8.3 MG/DL (ref 8.8–10.2)
CARBON DIOXIDE LEVEL: 25 MEQ/L (ref 21–32)
CARBON DIOXIDE LEVEL: 27 MEQ/L (ref 21–32)
CARBON DIOXIDE LEVEL: 29 MEQ/L (ref 21–32)
CHLORIDE LEVEL: 100 MEQ/L (ref 98–107)
CHLORIDE LEVEL: 103 MEQ/L (ref 98–107)
CHLORIDE LEVEL: 108 MEQ/L (ref 98–107)
CHOLESTEROL LEVEL: 56 MG/DL (ref ?–200)
CK MB CFR.DF SERPL CALC: 0.87
CK SERPL-CCNC: 742 U/L (ref 26–192)
CK SERPL-CCNC: 957 U/L (ref 26–192)
CK-MB VALUE MASS: 6.5 NG/ML (ref 0–3.6)
CREATININE FOR GFR: 2.64 MG/DL (ref 0.55–1.3)
CREATININE FOR GFR: 2.68 MG/DL (ref 0.55–1.3)
CREATININE FOR GFR: 2.8 MG/DL (ref 0.55–1.3)
DIFF SLIDE NUMBER: 317
EOS #: 0 10^3/UL (ref 0–0.5)
EOSINOPHIL NFR BLD AUTO: 0 % (ref 0–3)
GFR SERPL CREATININE-BSD FRML MDRD: 17.4 ML/MIN/{1.73_M2} (ref 39–?)
GFR SERPL CREATININE-BSD FRML MDRD: 18.3 ML/MIN/{1.73_M2} (ref 39–?)
GFR SERPL CREATININE-BSD FRML MDRD: 18.7 ML/MIN/{1.73_M2} (ref 39–?)
GLUCOSE BLDC GLUCOMTR-MCNC: 151 MG/DL (ref 83–110)
GLUCOSE BLDC GLUCOMTR-MCNC: 222 MG/DL (ref 83–110)
GLUCOSE, FASTING: 135 MG/DL (ref 70–100)
GLUCOSE, FASTING: 144 MG/DL (ref 70–100)
GLUCOSE, FASTING: 179 MG/DL (ref 70–100)
HEMATOCRIT: 25 % (ref 36–47)
HEMATOCRIT: 27.8 % (ref 36–47)
HEMATOCRIT: 27.9 % (ref 36–47)
HEMATOCRIT: 28.4 % (ref 36–47)
HEMOGLOBIN: 7.7 G/DL (ref 12–16)
HEMOGLOBIN: 9 G/DL (ref 12–16)
HEMOGLOBIN: 9 G/DL (ref 12–16)
HEMOGLOBIN: 9.4 G/DL (ref 12–16)
HYPOCHROMASIA: (no result)
IMMATURE GRANULOCYTE #: 0.6 10^3/UL (ref 0–0)
IMMATURE GRANULOCYTE %: 2.7 % (ref 0–0)
IMMEDIATE SPIN CROSSMATCH: 1 1
INR: 2.78
INR: 2.94
LACTIC ACID SEPSIS PROTOCOL: 8.3 MMOL/L (ref 0.4–2)
LDH SERPL L TO P-CCNC: 638 U/L (ref 84–246)
LYMPH #: 0.7 10^3/UL (ref 1.5–4.5)
LYMPH %: 3.4 % (ref 24–44)
LYMPHOCYTES: 22 % (ref 16–52)
MAGNESIUM LEVEL: 2.3 MG/DL (ref 1.8–2.4)
MEAN CORPUSCULAR HEMOGLOBIN: 29.5 PG (ref 27–33)
MEAN CORPUSCULAR HEMOGLOBIN: 29.8 PG (ref 27–33)
MEAN CORPUSCULAR HGB CONC: 30.8 G/DL (ref 32–36.5)
MEAN CORPUSCULAR HGB CONC: 33.1 G/DL (ref 32–36.5)
MEAN CORPUSCULAR VOLUME: 89 FL (ref 80–96)
MEAN CORPUSCULAR VOLUME: 96.9 FL (ref 80–96)
METAMYELOCYTES: 6 % (ref 0–0)
MONO #: 2 10^3/UL (ref 0–0.8)
MONO %: 9.4 % (ref 0–5)
MONOCYTES: 6 % (ref 0–8)
NEUTROPHILS #: 17.5 10^3/UL (ref 1.8–7.7)
NEUTROPHILS %: 84.3 % (ref 36–66)
NEUTROPHILS: 55 % (ref 35–75)
NRBC BLD AUTO-RTO: 30.4 % (ref 0–0)
NRBC BLD AUTO-RTO: 4.7 % (ref 0–0)
OVALOCYTES: (no result)
PARTIAL THROMBOPLASTIN TIME: 56.2 SECONDS (ref 26.8–37.9)
PHOSPHORUS LEVEL: 3.6 MG/DL (ref 2.5–4.9)
PLATELET BLD QL SMEAR: NORMAL
PLATELET CLUMP BLD QL SMEAR: (no result)
PLATELET COUNT, AUTOMATED: 206 10^3/UL (ref 150–450)
PLATELET COUNT, AUTOMATED: 240 10^3/UL (ref 150–450)
POIKILOCYTOSIS: (no result)
POLYCHROMASIA: (no result)
POS COUNT: (no result)
POSITIVE MORPH: (no result)
POTASSIUM SERUM: 3.3 MEQ/L (ref 3.5–5.1)
POTASSIUM SERUM: 3.7 MEQ/L (ref 3.5–5.1)
POTASSIUM SERUM: 3.9 MEQ/L (ref 3.5–5.1)
PROMYELOCYTES: 1 % (ref 0–0)
PROTHROMBIN TIME: 30.6 SECONDS (ref 12.4–14.5)
PROTHROMBIN TIME: 32 SECONDS (ref 12.4–14.5)
RED BLOOD COUNT: 2.58 10^6/UL (ref 4–5.4)
RED BLOOD COUNT: 3.19 10^6/UL (ref 4–5.4)
RED CELL DISTRIBUTION WIDTH: 20.1 % (ref 11.5–14.5)
RED CELL DISTRIBUTION WIDTH: 21.5 % (ref 11.5–14.5)
SODIUM LEVEL: 137 MEQ/L (ref 136–145)
SODIUM LEVEL: 140 MEQ/L (ref 136–145)
SODIUM LEVEL: 147 MEQ/L (ref 136–145)
TEAR DROP CELLS: (no result)
TOTAL PROTEIN: 4.2 GM/DL (ref 6.4–8.2)
TOTAL PROTEIN: 6.4 GM/DL (ref 6.4–8.2)
TRIGLYCERIDES LEVEL: 137 MG/DL (ref ?–150)
TROPONIN I: 2.42 NG/ML (ref ?–0.1)
WHITE BLOOD COUNT: 20.8 10^3/UL (ref 4–10)
WHITE BLOOD COUNT: 26.6 10^3/UL (ref 4–10)

## 2018-02-07 RX ADMIN — ETOMIDATE 1 MG: 20 INJECTION, SOLUTION INTRAVENOUS at 22:03

## 2018-02-07 RX ADMIN — IPRATROPIUM BROMIDE AND ALBUTEROL SULFATE 1 ML: .5; 3 SOLUTION RESPIRATORY (INHALATION) at 07:07

## 2018-02-07 RX ADMIN — ATORVASTATIN CALCIUM 1 MG: 20 TABLET, FILM COATED ORAL at 21:30

## 2018-02-07 RX ADMIN — POTASSIUM CHLORIDE 1 MEQ: 20 SOLUTION ORAL at 10:48

## 2018-02-07 RX ADMIN — SUCRALFATE 1 GM: 1 TABLET ORAL at 21:30

## 2018-02-07 RX ADMIN — DEXTROSE MONOHYDRATE 1 MLS/HR: 50 INJECTION, SOLUTION INTRAVENOUS at 21:25

## 2018-02-07 RX ADMIN — INSULIN LISPRO 1 UNITS: 100 INJECTION, SOLUTION INTRAVENOUS; SUBCUTANEOUS at 08:58

## 2018-02-07 RX ADMIN — INSULIN LISPRO 6 UNITS: 100 INJECTION, SOLUTION INTRAVENOUS; SUBCUTANEOUS at 12:35

## 2018-02-07 RX ADMIN — INSULIN LISPRO 1 UNITS: 100 INJECTION, SOLUTION INTRAVENOUS; SUBCUTANEOUS at 18:49

## 2018-02-07 RX ADMIN — DEXTROSE MONOHYDRATE 1 MG: 50 INJECTION, SOLUTION INTRAVENOUS at 17:21

## 2018-02-07 RX ADMIN — ASPIRIN 1 MG: 81 TABLET ORAL at 09:07

## 2018-02-07 RX ADMIN — POTASSIUM CHLORIDE 1 MEQ: 20 SOLUTION ORAL at 17:43

## 2018-02-07 RX ADMIN — DEXTROSE MONOHYDRATE 1 MG: 50 INJECTION, SOLUTION INTRAVENOUS at 09:06

## 2018-02-07 RX ADMIN — SUCCINYLCHOLINE CHLORIDE 1 MG: 20 INJECTION, SOLUTION INTRAMUSCULAR; INTRAVENOUS at 22:03

## 2018-02-07 RX ADMIN — IPRATROPIUM BROMIDE AND ALBUTEROL SULFATE 1 ML: .5; 3 SOLUTION RESPIRATORY (INHALATION) at 16:22

## 2018-02-07 RX ADMIN — GABAPENTIN 1 MG: 300 CAPSULE ORAL at 09:07

## 2018-02-07 RX ADMIN — SUCRALFATE 1 GM: 1 TABLET ORAL at 12:35

## 2018-02-07 RX ADMIN — IPRATROPIUM BROMIDE AND ALBUTEROL SULFATE 1 ML: .5; 3 SOLUTION RESPIRATORY (INHALATION) at 20:09

## 2018-02-07 RX ADMIN — SUCRALFATE 1 GM: 1 TABLET ORAL at 17:18

## 2018-02-07 RX ADMIN — SUCRALFATE 1 GM: 1 TABLET ORAL at 09:06

## 2018-02-07 RX ADMIN — DEXTROSE MONOHYDRATE 1 MG: 50 INJECTION, SOLUTION INTRAVENOUS at 21:30

## 2018-02-07 RX ADMIN — IPRATROPIUM BROMIDE AND ALBUTEROL SULFATE 1 ML: .5; 3 SOLUTION RESPIRATORY (INHALATION) at 11:25

## 2018-02-07 RX ADMIN — INSULIN LISPRO 1 UNITS: 100 INJECTION, SOLUTION INTRAVENOUS; SUBCUTANEOUS at 21:30

## 2018-02-07 RX ADMIN — POTASSIUM CHLORIDE 1 MLS/HR: 7.46 INJECTION, SOLUTION INTRAVENOUS at 17:22

## 2018-02-07 RX ADMIN — NYSTATIN 1 DOSE: 100000 POWDER TOPICAL at 21:30

## 2018-02-07 RX ADMIN — NYSTATIN 1 DOSE: 100000 POWDER TOPICAL at 09:06

## 2018-02-07 RX ADMIN — FUROSEMIDE 1 MG: 10 INJECTION, SOLUTION INTRAMUSCULAR; INTRAVENOUS at 14:05
